# Patient Record
Sex: MALE | Race: WHITE | NOT HISPANIC OR LATINO | Employment: OTHER | URBAN - METROPOLITAN AREA
[De-identification: names, ages, dates, MRNs, and addresses within clinical notes are randomized per-mention and may not be internally consistent; named-entity substitution may affect disease eponyms.]

---

## 2018-01-26 ENCOUNTER — RECORDS - HEALTHEAST (OUTPATIENT)
Dept: LAB | Facility: CLINIC | Age: 68
End: 2018-01-26

## 2018-01-26 LAB
ANION GAP SERPL CALCULATED.3IONS-SCNC: 8 MMOL/L (ref 5–18)
BUN SERPL-MCNC: 14 MG/DL (ref 8–22)
CALCIUM SERPL-MCNC: 9.5 MG/DL (ref 8.5–10.5)
CHLORIDE BLD-SCNC: 102 MMOL/L (ref 98–107)
CO2 SERPL-SCNC: 27 MMOL/L (ref 22–31)
CREAT SERPL-MCNC: 0.84 MG/DL (ref 0.7–1.3)
GFR SERPL CREATININE-BSD FRML MDRD: >60 ML/MIN/1.73M2
GLUCOSE BLD-MCNC: 103 MG/DL (ref 70–125)
POTASSIUM BLD-SCNC: 4.2 MMOL/L (ref 3.5–5)
SODIUM SERPL-SCNC: 137 MMOL/L (ref 136–145)

## 2019-01-28 ENCOUNTER — RECORDS - HEALTHEAST (OUTPATIENT)
Dept: LAB | Facility: CLINIC | Age: 69
End: 2019-01-28

## 2019-01-28 LAB
ANION GAP SERPL CALCULATED.3IONS-SCNC: 9 MMOL/L (ref 5–18)
BUN SERPL-MCNC: 14 MG/DL (ref 8–22)
CALCIUM SERPL-MCNC: 9.6 MG/DL (ref 8.5–10.5)
CHLORIDE BLD-SCNC: 100 MMOL/L (ref 98–107)
CO2 SERPL-SCNC: 28 MMOL/L (ref 22–31)
CREAT SERPL-MCNC: 0.89 MG/DL (ref 0.7–1.3)
GFR SERPL CREATININE-BSD FRML MDRD: >60 ML/MIN/1.73M2
GLUCOSE BLD-MCNC: 90 MG/DL (ref 70–125)
POTASSIUM BLD-SCNC: 4.3 MMOL/L (ref 3.5–5)
PSA SERPL-MCNC: 0.5 NG/ML (ref 0–4.5)
SODIUM SERPL-SCNC: 137 MMOL/L (ref 136–145)

## 2020-01-30 ENCOUNTER — RECORDS - HEALTHEAST (OUTPATIENT)
Dept: LAB | Facility: CLINIC | Age: 70
End: 2020-01-30

## 2020-01-30 LAB
ANION GAP SERPL CALCULATED.3IONS-SCNC: 8 MMOL/L (ref 5–18)
BUN SERPL-MCNC: 10 MG/DL (ref 8–22)
CALCIUM SERPL-MCNC: 9.4 MG/DL (ref 8.5–10.5)
CHLORIDE BLD-SCNC: 101 MMOL/L (ref 98–107)
CHOLEST SERPL-MCNC: 204 MG/DL
CO2 SERPL-SCNC: 29 MMOL/L (ref 22–31)
CREAT SERPL-MCNC: 0.82 MG/DL (ref 0.7–1.3)
FASTING STATUS PATIENT QL REPORTED: ABNORMAL
GFR SERPL CREATININE-BSD FRML MDRD: >60 ML/MIN/1.73M2
GLUCOSE BLD-MCNC: 104 MG/DL (ref 70–125)
HDLC SERPL-MCNC: 38 MG/DL
LDLC SERPL CALC-MCNC: 137 MG/DL
POTASSIUM BLD-SCNC: 4.6 MMOL/L (ref 3.5–5)
SODIUM SERPL-SCNC: 138 MMOL/L (ref 136–145)
TRIGL SERPL-MCNC: 145 MG/DL

## 2021-03-16 ENCOUNTER — RECORDS - HEALTHEAST (OUTPATIENT)
Dept: LAB | Facility: CLINIC | Age: 71
End: 2021-03-16

## 2021-03-16 LAB
ANION GAP SERPL CALCULATED.3IONS-SCNC: 7 MMOL/L (ref 5–18)
BUN SERPL-MCNC: 15 MG/DL (ref 8–22)
CALCIUM SERPL-MCNC: 9.3 MG/DL (ref 8.5–10.5)
CHLORIDE BLD-SCNC: 101 MMOL/L (ref 98–107)
CO2 SERPL-SCNC: 28 MMOL/L (ref 22–31)
CREAT SERPL-MCNC: 0.84 MG/DL (ref 0.7–1.3)
GFR SERPL CREATININE-BSD FRML MDRD: >60 ML/MIN/1.73M2
GLUCOSE BLD-MCNC: 89 MG/DL (ref 70–125)
POTASSIUM BLD-SCNC: 4.4 MMOL/L (ref 3.5–5)
SODIUM SERPL-SCNC: 136 MMOL/L (ref 136–145)

## 2022-03-31 ENCOUNTER — LAB REQUISITION (OUTPATIENT)
Dept: LAB | Facility: CLINIC | Age: 72
End: 2022-03-31

## 2022-03-31 DIAGNOSIS — I10 ESSENTIAL (PRIMARY) HYPERTENSION: ICD-10-CM

## 2022-03-31 LAB
ANION GAP SERPL CALCULATED.3IONS-SCNC: 13 MMOL/L (ref 5–18)
BUN SERPL-MCNC: 16 MG/DL (ref 8–28)
CALCIUM SERPL-MCNC: 9.5 MG/DL (ref 8.5–10.5)
CHLORIDE BLD-SCNC: 99 MMOL/L (ref 98–107)
CO2 SERPL-SCNC: 25 MMOL/L (ref 22–31)
CREAT SERPL-MCNC: 0.89 MG/DL (ref 0.7–1.3)
GFR SERPL CREATININE-BSD FRML MDRD: >90 ML/MIN/1.73M2
GLUCOSE BLD-MCNC: 96 MG/DL (ref 70–125)
POTASSIUM BLD-SCNC: 4.5 MMOL/L (ref 3.5–5)
SODIUM SERPL-SCNC: 137 MMOL/L (ref 136–145)

## 2022-03-31 PROCEDURE — 80048 BASIC METABOLIC PNL TOTAL CA: CPT | Performed by: FAMILY MEDICINE

## 2022-07-24 ENCOUNTER — APPOINTMENT (OUTPATIENT)
Dept: RADIOLOGY | Facility: HOSPITAL | Age: 72
DRG: 281 | End: 2022-07-24
Attending: EMERGENCY MEDICINE
Payer: COMMERCIAL

## 2022-07-24 ENCOUNTER — HOSPITAL ENCOUNTER (INPATIENT)
Facility: HOSPITAL | Age: 72
LOS: 3 days | Discharge: HOME | DRG: 281 | End: 2022-07-27
Attending: EMERGENCY MEDICINE | Admitting: FAMILY MEDICINE
Payer: COMMERCIAL

## 2022-07-24 DIAGNOSIS — K57.92 DIVERTICULITIS: ICD-10-CM

## 2022-07-24 DIAGNOSIS — I21.4 NSTEMI (NON-ST ELEVATED MYOCARDIAL INFARCTION) (H): Primary | ICD-10-CM

## 2022-07-24 LAB
ALBUMIN SERPL-MCNC: 3.8 G/DL (ref 3.5–5)
ALP SERPL-CCNC: 58 U/L (ref 45–120)
ALT SERPL W P-5'-P-CCNC: 26 U/L (ref 0–45)
ANION GAP SERPL CALCULATED.3IONS-SCNC: 7 MMOL/L (ref 5–18)
APTT PPP: 33 SECONDS (ref 22–38)
AST SERPL W P-5'-P-CCNC: 42 U/L (ref 0–40)
BILIRUB DIRECT SERPL-MCNC: 0.2 MG/DL
BILIRUB SERPL-MCNC: 0.6 MG/DL (ref 0–1)
BNP SERPL-MCNC: 24 PG/ML (ref 0–67)
BUN SERPL-MCNC: 13 MG/DL (ref 8–28)
CALCIUM SERPL-MCNC: 9.3 MG/DL (ref 8.5–10.5)
CHLORIDE BLD-SCNC: 100 MMOL/L (ref 98–107)
CO2 SERPL-SCNC: 26 MMOL/L (ref 22–31)
CREAT SERPL-MCNC: 0.84 MG/DL (ref 0.7–1.3)
ERYTHROCYTE [DISTWIDTH] IN BLOOD BY AUTOMATED COUNT: 12.3 % (ref 10–15)
GFR SERPL CREATININE-BSD FRML MDRD: >90 ML/MIN/1.73M2
GLUCOSE BLD-MCNC: 105 MG/DL (ref 70–125)
HCT VFR BLD AUTO: 44.1 % (ref 40–53)
HGB BLD-MCNC: 15.1 G/DL (ref 13.3–17.7)
HOLD SPECIMEN: NORMAL
INR PPP: 1.01 (ref 0.85–1.15)
LIPASE SERPL-CCNC: 12 U/L (ref 0–52)
MCH RBC QN AUTO: 30.6 PG (ref 26.5–33)
MCHC RBC AUTO-ENTMCNC: 34.2 G/DL (ref 31.5–36.5)
MCV RBC AUTO: 89 FL (ref 78–100)
PLATELET # BLD AUTO: 218 10E3/UL (ref 150–450)
POTASSIUM BLD-SCNC: 4.3 MMOL/L (ref 3.5–5)
PROT SERPL-MCNC: 7.1 G/DL (ref 6–8)
RBC # BLD AUTO: 4.94 10E6/UL (ref 4.4–5.9)
SARS-COV-2 RNA RESP QL NAA+PROBE: NEGATIVE
SODIUM SERPL-SCNC: 133 MMOL/L (ref 136–145)
TROPONIN I SERPL-MCNC: 1.31 NG/ML (ref 0–0.29)
TROPONIN I SERPL-MCNC: 3.77 NG/ML (ref 0–0.29)
WBC # BLD AUTO: 6.7 10E3/UL (ref 4–11)

## 2022-07-24 PROCEDURE — 210N000001 HC R&B IMCU HEART CARE

## 2022-07-24 PROCEDURE — 250N000013 HC RX MED GY IP 250 OP 250 PS 637: Performed by: EMERGENCY MEDICINE

## 2022-07-24 PROCEDURE — 80053 COMPREHEN METABOLIC PANEL: CPT | Performed by: EMERGENCY MEDICINE

## 2022-07-24 PROCEDURE — 83880 ASSAY OF NATRIURETIC PEPTIDE: CPT | Performed by: EMERGENCY MEDICINE

## 2022-07-24 PROCEDURE — 85730 THROMBOPLASTIN TIME PARTIAL: CPT | Performed by: EMERGENCY MEDICINE

## 2022-07-24 PROCEDURE — 93005 ELECTROCARDIOGRAM TRACING: CPT

## 2022-07-24 PROCEDURE — 82248 BILIRUBIN DIRECT: CPT | Performed by: EMERGENCY MEDICINE

## 2022-07-24 PROCEDURE — C9803 HOPD COVID-19 SPEC COLLECT: HCPCS

## 2022-07-24 PROCEDURE — 85610 PROTHROMBIN TIME: CPT | Performed by: EMERGENCY MEDICINE

## 2022-07-24 PROCEDURE — 85027 COMPLETE CBC AUTOMATED: CPT | Performed by: EMERGENCY MEDICINE

## 2022-07-24 PROCEDURE — 96375 TX/PRO/DX INJ NEW DRUG ADDON: CPT

## 2022-07-24 PROCEDURE — 96366 THER/PROPH/DIAG IV INF ADDON: CPT

## 2022-07-24 PROCEDURE — 36592 COLLECT BLOOD FROM PICC: CPT | Performed by: INTERNAL MEDICINE

## 2022-07-24 PROCEDURE — 84484 ASSAY OF TROPONIN QUANT: CPT | Performed by: EMERGENCY MEDICINE

## 2022-07-24 PROCEDURE — 83690 ASSAY OF LIPASE: CPT | Performed by: EMERGENCY MEDICINE

## 2022-07-24 PROCEDURE — 99223 1ST HOSP IP/OBS HIGH 75: CPT | Performed by: FAMILY MEDICINE

## 2022-07-24 PROCEDURE — 250N000011 HC RX IP 250 OP 636: Performed by: EMERGENCY MEDICINE

## 2022-07-24 PROCEDURE — 99285 EMERGENCY DEPT VISIT HI MDM: CPT | Mod: 25

## 2022-07-24 PROCEDURE — 96365 THER/PROPH/DIAG IV INF INIT: CPT

## 2022-07-24 PROCEDURE — 86901 BLOOD TYPING SEROLOGIC RH(D): CPT | Performed by: INTERNAL MEDICINE

## 2022-07-24 PROCEDURE — 93005 ELECTROCARDIOGRAM TRACING: CPT | Performed by: STUDENT IN AN ORGANIZED HEALTH CARE EDUCATION/TRAINING PROGRAM

## 2022-07-24 PROCEDURE — 93005 ELECTROCARDIOGRAM TRACING: CPT | Mod: 76

## 2022-07-24 PROCEDURE — 71046 X-RAY EXAM CHEST 2 VIEWS: CPT

## 2022-07-24 PROCEDURE — 87635 SARS-COV-2 COVID-19 AMP PRB: CPT | Performed by: EMERGENCY MEDICINE

## 2022-07-24 PROCEDURE — 36415 COLL VENOUS BLD VENIPUNCTURE: CPT | Performed by: EMERGENCY MEDICINE

## 2022-07-24 PROCEDURE — 93005 ELECTROCARDIOGRAM TRACING: CPT | Performed by: EMERGENCY MEDICINE

## 2022-07-24 RX ORDER — ASPIRIN 81 MG/1
81 TABLET, CHEWABLE ORAL DAILY
Status: DISCONTINUED | OUTPATIENT
Start: 2022-07-25 | End: 2022-07-27 | Stop reason: HOSPADM

## 2022-07-24 RX ORDER — VITAMIN E 268 MG
400 CAPSULE ORAL DAILY
COMMUNITY

## 2022-07-24 RX ORDER — ONDANSETRON 2 MG/ML
4 INJECTION INTRAMUSCULAR; INTRAVENOUS EVERY 6 HOURS PRN
Status: DISCONTINUED | OUTPATIENT
Start: 2022-07-24 | End: 2022-07-27 | Stop reason: HOSPADM

## 2022-07-24 RX ORDER — PROCHLORPERAZINE 25 MG
12.5 SUPPOSITORY, RECTAL RECTAL EVERY 12 HOURS PRN
Status: DISCONTINUED | OUTPATIENT
Start: 2022-07-24 | End: 2022-07-27 | Stop reason: HOSPADM

## 2022-07-24 RX ORDER — PANTOPRAZOLE SODIUM 40 MG/1
40 TABLET, DELAYED RELEASE ORAL DAILY
Status: DISCONTINUED | OUTPATIENT
Start: 2022-07-25 | End: 2022-07-27 | Stop reason: HOSPADM

## 2022-07-24 RX ORDER — NITROGLYCERIN 80 MG/1
1 PATCH TRANSDERMAL ONCE
Status: DISCONTINUED | OUTPATIENT
Start: 2022-07-24 | End: 2022-07-24

## 2022-07-24 RX ORDER — MORPHINE SULFATE 2 MG/ML
1 INJECTION, SOLUTION INTRAMUSCULAR; INTRAVENOUS
Status: DISCONTINUED | OUTPATIENT
Start: 2022-07-24 | End: 2022-07-27 | Stop reason: HOSPADM

## 2022-07-24 RX ORDER — LISINOPRIL 5 MG/1
10 TABLET ORAL DAILY
Status: DISCONTINUED | OUTPATIENT
Start: 2022-07-25 | End: 2022-07-27 | Stop reason: HOSPADM

## 2022-07-24 RX ORDER — ACETAMINOPHEN 325 MG/1
650 TABLET ORAL EVERY 6 HOURS PRN
Status: DISCONTINUED | OUTPATIENT
Start: 2022-07-24 | End: 2022-07-25

## 2022-07-24 RX ORDER — ACETAMINOPHEN 650 MG/1
650 SUPPOSITORY RECTAL EVERY 6 HOURS PRN
Status: DISCONTINUED | OUTPATIENT
Start: 2022-07-24 | End: 2022-07-27 | Stop reason: HOSPADM

## 2022-07-24 RX ORDER — NITROGLYCERIN 20 MG/100ML
10-200 INJECTION INTRAVENOUS CONTINUOUS
Status: DISCONTINUED | OUTPATIENT
Start: 2022-07-24 | End: 2022-07-25

## 2022-07-24 RX ORDER — LIDOCAINE 40 MG/G
CREAM TOPICAL
Status: DISCONTINUED | OUTPATIENT
Start: 2022-07-24 | End: 2022-07-27 | Stop reason: HOSPADM

## 2022-07-24 RX ORDER — ATORVASTATIN CALCIUM 40 MG/1
40 TABLET, FILM COATED ORAL DAILY
Status: DISCONTINUED | OUTPATIENT
Start: 2022-07-25 | End: 2022-07-27 | Stop reason: HOSPADM

## 2022-07-24 RX ORDER — ASPIRIN 81 MG/1
324 TABLET, CHEWABLE ORAL ONCE
Status: DISCONTINUED | OUTPATIENT
Start: 2022-07-24 | End: 2022-07-24

## 2022-07-24 RX ORDER — HEPARIN SODIUM 10000 [USP'U]/100ML
0-5000 INJECTION, SOLUTION INTRAVENOUS CONTINUOUS
Status: DISCONTINUED | OUTPATIENT
Start: 2022-07-24 | End: 2022-07-25

## 2022-07-24 RX ORDER — ONDANSETRON 4 MG/1
4 TABLET, ORALLY DISINTEGRATING ORAL EVERY 6 HOURS PRN
Status: DISCONTINUED | OUTPATIENT
Start: 2022-07-24 | End: 2022-07-27 | Stop reason: HOSPADM

## 2022-07-24 RX ORDER — ASPIRIN 81 MG/1
162 TABLET, CHEWABLE ORAL ONCE
Status: COMPLETED | OUTPATIENT
Start: 2022-07-24 | End: 2022-07-24

## 2022-07-24 RX ORDER — PROCHLORPERAZINE MALEATE 5 MG
5 TABLET ORAL EVERY 6 HOURS PRN
Status: DISCONTINUED | OUTPATIENT
Start: 2022-07-24 | End: 2022-07-27 | Stop reason: HOSPADM

## 2022-07-24 RX ADMIN — HEPARIN SODIUM 1200 UNITS/HR: 10000 INJECTION, SOLUTION INTRAVENOUS at 21:55

## 2022-07-24 RX ADMIN — ASPIRIN 81 MG CHEWABLE TABLET 162 MG: 81 TABLET CHEWABLE at 21:43

## 2022-07-24 RX ADMIN — NITROGLYCERIN 1 PATCH: 0.4 PATCH TRANSDERMAL at 21:53

## 2022-07-24 ASSESSMENT — ACTIVITIES OF DAILY LIVING (ADL): ADLS_ACUITY_SCORE: 35

## 2022-07-24 NOTE — ED TRIAGE NOTES
"Pt c/o right chest pain and bilateral jaw pain over the 1.5 days. This is intermitent but is getting more frequent. Pain at its worse is 5/10 and it is 2/10 now.  pts chest\"burns\" at times as well.  No sob. Some dizziness. No prior heart issues.      "

## 2022-07-24 NOTE — ED PROVIDER NOTES
"  Emergency Department Encounter     Evaluation Date & Time:   2022  4:58 PM    CHIEF COMPLAINT:  Chest Pain      Triage Note: Pt c/o right chest pain and bilateral jaw pain over the 1.5 days. This is intermitent but is getting more frequent. Pain at its worse is 5/10 and it is 2/10 now.  pts chest\"burns\" at times as well.  No sob. Some dizziness. No prior heart issues.      Impression and Plan       FINAL IMPRESSION:    ICD-10-CM    1. NSTEMI (non-ST elevated myocardial infarction) (H)  I21.4          ED COURSE & MEDICAL DECISION MAKIN:03 PM I met with patient for initial interview and encounter. PPE worn includes surgical mask.  9:39 PM I spoke to Dr. Osman with cardiology regarding plan for care.    72 year old male, history of diverticulitis, who presents for evaluation of BL burning chest pain radiating to BL jaw and right shoulder that has been constant since onset yesterday. Symptoms are worse with exertion. He has associated lightheadedness with no shortness of breath, nausea, vomiting, diaphoresis.    On exam he has borderline bradycardic rate with regular rhythm and clear lungs.    EKG performed and demonstrated sinus bradycardia with incomplete RBBB and no ST-T wave changes consistent with ACS.    CXR negative.    Troponin extremely delayed; I called the Lab twice with the first call being ~90 minutes after the troponin had been sent and was told it would be only a few more minutes. After it still had not been resulted 4 hours later, I again called the Lab and was placed on hold for several minutes and had to disconnect to care for other patients. I asked the charge RN to call Lab and she was informed that the troponin and BMP had hemolyzed. The oncoming RN resent blood (including blood for a delta troponin) and when the initial troponin finally resulted it was elevated at 1.31. The delta troponin was further elevated to 3.77.    Patient given aspirin and Nitropaste was applied. After discussion " to rule out contraindications to anticoagulation, a heparin drip was initiated.    Repeat EKG performed with no significant changes or ST-T wave elevation consistent with ACS.    Cardiology consulted and agree with Nitropaste and heparin drip.  They also recommend starting a statin.  Given bradycardia and blood pressures of 120s, will hold on metoprolol at this time. NPO at midnight.    Pain is much improved after Nitropaste, now rating his pain at ~2/10. He declined morphine.    Patient admitted to Hospitalist Service. Patient hemodynamically stable throughout ED course.      At the conclusion of the encounter I discussed the results of all the tests and the disposition. The questions were answered. The patient and family acknowledged understanding and were agreeable with the care plan.    30 minutes of critical care time    MEDICATIONS GIVEN IN THE EMERGENCY DEPARTMENT:  Medications   heparin infusion 25,000 units in D5W 250 mL ANTICOAGULANT (1,200 Units/hr Intravenous Rate/Dose Verify 7/25/22 0626)   lisinopril (ZESTRIL) tablet 10 mg (10 mg Oral Given 7/25/22 0759)   pantoprazole (PROTONIX) EC tablet 40 mg (40 mg Oral Given 7/25/22 0759)   medication instruction (has no administration in time range)   aspirin (ASA) chewable tablet 81 mg (81 mg Oral Given 7/25/22 0751)   HOLD: nitroGLYcerin IF (has no administration in time range)   Reason beta blocker not prescribed (has no administration in time range)   Continuing ACE inhibitor/ARB/ARNI from home medication list OR ACE inhibitor/ARB order already placed during this visit (has no administration in time range)   atorvastatin (LIPITOR) tablet 40 mg (has no administration in time range)   morphine (PF) injection 1 mg (has no administration in time range)   nitroGLYcerin 50 mg in D5W 250 mL (adult std) infusion CENTRAL (0 mcg/min Intravenous Stopped 7/25/22 0650)   lidocaine 1 % 0.1-1 mL (has no administration in time range)   lidocaine (LMX4) cream (has no  administration in time range)   sodium chloride (PF) 0.9% PF flush 3 mL (3 mLs Intracatheter Not Given 7/25/22 0645)   sodium chloride (PF) 0.9% PF flush 3 mL (has no administration in time range)   melatonin tablet 1 mg (has no administration in time range)   Patient is already receiving anticoagulation with heparin, enoxaparin (LOVENOX), warfarin (COUMADIN)  or other anticoagulant medication (has no administration in time range)   acetaminophen (TYLENOL) tablet 650 mg (has no administration in time range)     Or   acetaminophen (TYLENOL) Suppository 650 mg (has no administration in time range)   ondansetron (ZOFRAN ODT) ODT tab 4 mg (has no administration in time range)     Or   ondansetron (ZOFRAN) injection 4 mg (has no administration in time range)   prochlorperazine (COMPAZINE) injection 5 mg (has no administration in time range)     Or   prochlorperazine (COMPAZINE) tablet 5 mg (has no administration in time range)     Or   prochlorperazine (COMPAZINE) suppository 12.5 mg (has no administration in time range)   nitroGLYcerin (NITROSTAT) sublingual tablet 0.4 mg (0.4 mg Sublingual Given 7/25/22 0019)   aspirin (ASA) chewable tablet 162 mg (162 mg Oral Given 7/24/22 2143)   heparin ANTICOAGULANT loading dose for  LOW INTENSITY TREATMENT* Give BEFORE starting heparin infusion (6,000 Units Intravenous Given 7/24/22 2155)   aspirin EC tablet 162 mg (162 mg Oral Given 7/25/22 0019)       NEW PRESCRIPTIONS STARTED AT TODAY'S ED VISIT:  New Prescriptions    No medications on file       HPI     HPI     Dilip Frye is a 72 year old male, history of diverticulitis, who presents to this ED by walk-in for evaluation of chest pain.     Patient reports an ongoing burning sensation in both lungs (R > L) with associated bilateral jaw pain that right shoulder pain that has been nearly constant since onset yesterday. Symptoms are worse with exertion. The pain is not pleuritic in nature. He reports associated  lightheadedness.  No associated shortness of breath, nausea, vomiting, diaphoresis.    He has otherwise been in his usual state of health and denies abdominal pain, N/V/D, cough, fever or other concerns.    REVIEW OF SYSTEMS:  All other systems reviewed and are negative.      Medical History     Past Medical History:   Diagnosis Date     Benign essential hypertension      Gastroesophageal reflux disease with esophagitis        Past Surgical History:   Procedure Laterality Date     CERVICAL LAMINECTOMY         Family History   Problem Relation Age of Onset     Brain Cancer Mother      No Known Problems Father      Coronary Artery Disease Early Onset No family hx of        Social History     Tobacco Use     Smoking status: Never Smoker     Smokeless tobacco: Never Used   Substance Use Topics     Alcohol use: Yes     Alcohol/week: 1.0 standard drink     Comment: Alcoholic Drinks/day: One drink twice a month per pt     Drug use: No       cholecalciferol (VITAMIN D3) 125 mcg (5000 units) capsule  lisinopril (PRINIVIL,ZESTRIL) 10 MG tablet  pantoprazole (PROTONIX) 40 MG tablet  vitamin E (TOCOPHEROL) 400 units (180 mg) capsule        Physical Exam     First Vitals:  Patient Vitals for the past 24 hrs:   BP Temp Temp src Pulse Resp SpO2 Height Weight   07/25/22 0748 119/73 98.1  F (36.7  C) Oral 69 24 98 % -- --   07/25/22 0645 99/69 -- -- 59 20 94 % -- --   07/25/22 0640 104/70 -- -- 59 16 96 % -- --   07/25/22 0635 101/62 -- -- 53 14 96 % -- --   07/25/22 0630 90/65 -- -- 57 15 96 % -- --   07/25/22 0625 93/66 -- -- 54 14 95 % -- --   07/25/22 0620 107/66 -- -- 56 14 97 % -- --   07/25/22 0615 97/62 -- -- (!) 48 14 95 % -- --   07/25/22 0610 104/67 -- -- 52 15 96 % -- --   07/25/22 0605 102/71 -- -- (!) 49 15 95 % -- --   07/25/22 0600 102/69 -- -- 51 14 95 % -- --   07/25/22 0555 102/75 -- -- 63 18 95 % -- --   07/25/22 0550 109/72 -- -- 53 16 96 % -- --   07/25/22 0545 99/62 -- -- 52 16 95 % -- --   07/25/22 0540  110/58 -- -- 55 14 94 % -- --   07/25/22 0535 104/72 -- -- 58 14 98 % -- --   07/25/22 0530 101/68 -- -- 53 16 95 % -- --   07/25/22 0525 106/70 -- -- 56 15 96 % -- --   07/25/22 0520 98/68 -- -- 52 15 95 % -- --   07/25/22 0515 104/70 -- -- 54 13 96 % -- --   07/25/22 0510 104/70 -- -- 51 17 96 % -- --   07/25/22 0505 98/70 -- -- 52 12 96 % -- --   07/25/22 0500 94/66 -- -- (!) 49 16 95 % -- --   07/25/22 0455 95/65 -- -- (!) 49 13 95 % -- --   07/25/22 0450 93/67 -- -- 54 16 94 % -- --   07/25/22 0445 93/64 -- -- 53 18 95 % -- --   07/25/22 0440 94/69 -- -- 58 24 95 % -- --   07/25/22 0435 92/65 -- -- 50 16 95 % -- --   07/25/22 0430 92/69 -- -- (!) 49 16 95 % -- --   07/25/22 0425 (!) 87/67 -- -- 53 16 95 % -- --   07/25/22 0420 96/67 -- -- 56 14 94 % -- --   07/25/22 0415 110/67 -- -- 55 17 97 % -- --   07/25/22 0410 102/64 -- -- 50 12 97 % -- --   07/25/22 0405 98/61 -- -- 58 20 95 % -- --   07/25/22 0400 99/65 -- -- 54 14 95 % -- --   07/25/22 0355 104/75 -- -- 56 21 99 % -- --   07/25/22 0350 90/65 -- -- (!) 48 15 95 % -- --   07/25/22 0345 97/67 -- -- 52 18 94 % -- --   07/25/22 0340 94/66 -- -- 54 18 95 % -- --   07/25/22 0335 104/73 -- -- 62 21 98 % -- --   07/25/22 0330 110/67 -- -- 57 14 96 % -- --   07/25/22 0325 99/70 -- -- 57 11 96 % -- --   07/25/22 0320 92/65 -- -- 56 13 94 % -- --   07/25/22 0315 103/59 -- -- 54 10 96 % -- --   07/25/22 0310 108/67 -- -- 55 14 95 % -- --   07/25/22 0305 109/74 -- -- 55 11 97 % -- --   07/25/22 0300 106/72 -- -- 57 10 99 % -- --   07/25/22 0219 105/72 -- -- 56 16 97 % -- --   07/25/22 0100 94/58 -- -- 54 14 96 % -- --   07/25/22 0046 (!) 89/56 -- -- 55 13 95 % -- --   07/24/22 2345 90/59 -- -- 52 13 93 % -- --   07/24/22 2330 93/60 -- -- 51 12 93 % -- --   07/24/22 2315 104/67 -- -- 52 20 97 % -- --   07/24/22 2300 113/69 -- -- 55 16 97 % -- --   07/24/22 2255 116/66 -- -- 56 17 97 % -- --   07/24/22 2245 -- -- -- 54 12 96 % -- --   07/24/22 2230 -- -- -- 63 18 98 %  "-- --   07/24/22 2215 -- -- -- 62 18 98 % -- --   07/24/22 2200 120/82 -- -- 59 11 98 % -- --   07/24/22 2125 -- -- -- 58 16 99 % -- --   07/24/22 2100 -- -- -- 59 16 100 % -- --   07/24/22 1945 -- -- -- 57 18 100 % -- --   07/24/22 1930 122/79 -- -- 52 12 100 % -- --   07/24/22 1915 126/84 -- -- 52 11 100 % -- --   07/24/22 1900 119/80 -- -- 56 16 100 % -- --   07/24/22 1845 127/77 -- -- 54 15 100 % -- --   07/24/22 1715 122/78 -- -- 54 11 99 % -- --   07/24/22 1646 (!) 144/87 -- -- -- -- -- -- --   07/24/22 1644 -- 96.8  F (36  C) Tympanic 61 12 100 % 1.93 m (6' 4\") 100.2 kg (221 lb)       PHYSICAL EXAM:   Physical Exam    GENERAL: Awake, alert.  In no acute distress.   HEENT: Normocephalic, atraumatic. Pupils equal, round and reactive. Conjunctiva normal.   NECK: No stridor.  PULMONARY: Symmetrical breath sounds without distress.  Lungs clear to auscultation bilaterally without wheezes, rhonchi or rales.  CARDIO: Regular rate and rhythm.  No significant murmur, rub or gallop.  Radial pulses strong and symmetrical.  ABDOMINAL: Abdomen soft, non-distended and non-tender to palpation.   EXTREMITIES: No lower extremity swelling or edema.      NEURO: Alert and oriented to person, place and time.  Cranial nerves grossly intact.  No focal motor deficit.  PSYCH: Normal mood and affect.  SKIN: No rashes.     Results     LAB:  All pertinent labs reviewed and interpreted  Labs Ordered and Resulted from Time of ED Arrival to Time of ED Departure   BASIC METABOLIC PANEL - Abnormal       Result Value    Sodium 133 (*)     Potassium 4.3      Chloride 100      Carbon Dioxide (CO2) 26      Anion Gap 7      Urea Nitrogen 13      Creatinine 0.84      Calcium 9.3      Glucose 105      GFR Estimate >90     TROPONIN I - Abnormal    Troponin I 1.31 (*)    HEPATIC FUNCTION PANEL - Abnormal    Bilirubin Total 0.6      Bilirubin Direct 0.2      Protein Total 7.1      Albumin 3.8      Alkaline Phosphatase 58      AST 42 (*)     ALT 26   "   TROPONIN I - Abnormal    Troponin I 3.77 (*)    TROPONIN I - Abnormal    Troponin I 6.24 (*)    LIPID REFLEX TO DIRECT LDL PANEL - Abnormal    Cholesterol 184      Triglycerides 171 (*)     Direct Measure HDL 31 (*)     LDL Cholesterol Calculated 119     BASIC METABOLIC PANEL - Abnormal    Sodium 133 (*)     Potassium 4.2      Chloride 101      Carbon Dioxide (CO2) 24      Anion Gap 8      Urea Nitrogen 13      Creatinine 0.79      Calcium 9.0      Glucose 105      GFR Estimate >90     CBC WITH PLATELETS - Normal    WBC Count 6.7      RBC Count 4.94      Hemoglobin 15.1      Hematocrit 44.1      MCV 89      MCH 30.6      MCHC 34.2      RDW 12.3      Platelet Count 218     B-TYPE NATRIURETIC PEPTIDE ([facility] ONLY) - Normal    BNP 24     INR - Normal    INR 1.01     PARTIAL THROMBOPLASTIN TIME - Normal    aPTT 33     LIPASE - Normal    Lipase 12     COVID-19 VIRUS (CORONAVIRUS) BY PCR - Normal    SARS CoV2 PCR Negative     HEPARIN UNFRACTIONATED ANTI XA LEVEL - Normal    Anti Xa Unfractionated Heparin 0.25     TSH - Normal    TSH 2.04     CORTISOL    Cortisol 17.6     TROPONIN I   HEPARIN UNFRACTIONATED ANTI XA LEVEL       RADIOLOGY:  Chest XR,  PA & LAT   Final Result   IMPRESSION: Negative chest.            EC2022, 16:52; sinus bradycardia with rate of 57 bpm; normal intervals; incomplete RBBB; no ST-T wave changes consistent with ACS or pericarditis; no previous EKG available for comparison    EKG independently reviewed and interpreted by Radha Villanueva MD    2022, 21:41; sinus bradycardia with rate of 56 bpm; occasional PVCs; normal intervals; incomplete RBBB; no ST-T wave changes consistent with ACS or pericarditis; compared to previous EKG dated 2022, occasional PVCs now present    EKG independently reviewed and interpreted by Radha Villanueva MD      PROCEDURES:  Procedures:      Critical Care     Performed by: Dr Radha Villanueva  Authorized by: Dr Radha Villanueva  Total critical care time: 30  minutes  Critical care was necessary to treat or prevent imminent or life-threatening deterioration of the following conditions: Acute coronary syndrome / ACS    Critical care was time spent personally by me on the following activities: development of treatment plan with patient or surrogate, discussions with consultants, examination of patient, evaluation of patient's response to treatment, obtaining history from patient or surrogate, ordering and performing treatments and interventions, ordering and review of laboratory studies, ordering and review of radiographic studies, re-evaluation of patient's condition and monitoring for potential decompensation.  Critical care time was exclusive of separately billable procedures and treating other patients.      I, Rob Capps, am serving as a scribe to document services personally performed by Radha Villanueva MD based on my observation and the provider's statements to me. I, Radha Villanueva MD attest that Rob Capps is acting in a scribe capacity, has observed my performance of the services and has documented them in accordance with my direction.    Radha Villanueva MD  Emergency Medicine  [facility] EMERGENCY DEPARTMENT         Radha Villanueva MD  07/25/22 9238

## 2022-07-25 ENCOUNTER — APPOINTMENT (OUTPATIENT)
Dept: CARDIOLOGY | Facility: HOSPITAL | Age: 72
DRG: 281 | End: 2022-07-25
Attending: INTERNAL MEDICINE
Payer: COMMERCIAL

## 2022-07-25 PROBLEM — R00.1 SINUS BRADYCARDIA: Status: ACTIVE | Noted: 2022-07-25

## 2022-07-25 PROBLEM — I25.10 CAD (CORONARY ARTERY DISEASE): Status: ACTIVE | Noted: 2022-07-25

## 2022-07-25 PROBLEM — K21.9 GASTROESOPHAGEAL REFLUX DISEASE WITHOUT ESOPHAGITIS: Status: ACTIVE | Noted: 2022-07-25

## 2022-07-25 PROBLEM — E87.1 HYPONATREMIA: Status: ACTIVE | Noted: 2022-07-25

## 2022-07-25 PROBLEM — I10 BENIGN ESSENTIAL HYPERTENSION: Status: ACTIVE | Noted: 2022-07-25

## 2022-07-25 LAB
ABO/RH(D): NORMAL
ANION GAP SERPL CALCULATED.3IONS-SCNC: 8 MMOL/L (ref 5–18)
ANTIBODY SCREEN: NEGATIVE
ATRIAL RATE - MUSE: 56 BPM
ATRIAL RATE - MUSE: 57 BPM
BI-PLANE LVEF ECHO: NORMAL
BUN SERPL-MCNC: 13 MG/DL (ref 8–28)
CALCIUM SERPL-MCNC: 9 MG/DL (ref 8.5–10.5)
CHLORIDE BLD-SCNC: 101 MMOL/L (ref 98–107)
CHOLEST SERPL-MCNC: 184 MG/DL
CO2 SERPL-SCNC: 24 MMOL/L (ref 22–31)
CORTIS SERPL-MCNC: 17.6 UG/DL
CREAT SERPL-MCNC: 0.79 MG/DL (ref 0.7–1.3)
DIASTOLIC BLOOD PRESSURE - MUSE: 86 MMHG
DIASTOLIC BLOOD PRESSURE - MUSE: NORMAL MMHG
GFR SERPL CREATININE-BSD FRML MDRD: >90 ML/MIN/1.73M2
GLUCOSE BLD-MCNC: 105 MG/DL (ref 70–125)
HDLC SERPL-MCNC: 31 MG/DL
INTERPRETATION ECG - MUSE: NORMAL
INTERPRETATION ECG - MUSE: NORMAL
LDLC SERPL CALC-MCNC: 119 MG/DL
P AXIS - MUSE: 64 DEGREES
P AXIS - MUSE: 72 DEGREES
POTASSIUM BLD-SCNC: 4.2 MMOL/L (ref 3.5–5)
PR INTERVAL - MUSE: 156 MS
PR INTERVAL - MUSE: 164 MS
QRS DURATION - MUSE: 100 MS
QRS DURATION - MUSE: 106 MS
QT - MUSE: 404 MS
QT - MUSE: 404 MS
QTC - MUSE: 389 MS
QTC - MUSE: 393 MS
R AXIS - MUSE: -6 DEGREES
R AXIS - MUSE: 58 DEGREES
SODIUM SERPL-SCNC: 133 MMOL/L (ref 136–145)
SPECIMEN EXPIRATION DATE: NORMAL
SYSTOLIC BLOOD PRESSURE - MUSE: 130 MMHG
SYSTOLIC BLOOD PRESSURE - MUSE: NORMAL MMHG
T AXIS - MUSE: 5 DEGREES
T AXIS - MUSE: 67 DEGREES
TRIGL SERPL-MCNC: 171 MG/DL
TROPONIN I SERPL-MCNC: 6.24 NG/ML (ref 0–0.29)
TROPONIN I SERPL-MCNC: 7.53 NG/ML (ref 0–0.29)
TSH SERPL DL<=0.005 MIU/L-ACNC: 2.04 UIU/ML (ref 0.3–5)
UFH PPP CHRO-ACNC: 0.25 IU/ML
VENTRICULAR RATE- MUSE: 56 BPM
VENTRICULAR RATE- MUSE: 57 BPM

## 2022-07-25 PROCEDURE — C1894 INTRO/SHEATH, NON-LASER: HCPCS | Performed by: INTERNAL MEDICINE

## 2022-07-25 PROCEDURE — 84443 ASSAY THYROID STIM HORMONE: CPT | Performed by: FAMILY MEDICINE

## 2022-07-25 PROCEDURE — 250N000013 HC RX MED GY IP 250 OP 250 PS 637: Performed by: INTERNAL MEDICINE

## 2022-07-25 PROCEDURE — 4A023N7 MEASUREMENT OF CARDIAC SAMPLING AND PRESSURE, LEFT HEART, PERCUTANEOUS APPROACH: ICD-10-PCS | Performed by: INTERNAL MEDICINE

## 2022-07-25 PROCEDURE — 99223 1ST HOSP IP/OBS HIGH 75: CPT | Mod: 25 | Performed by: INTERNAL MEDICINE

## 2022-07-25 PROCEDURE — 99233 SBSQ HOSP IP/OBS HIGH 50: CPT | Performed by: FAMILY MEDICINE

## 2022-07-25 PROCEDURE — 210N000001 HC R&B IMCU HEART CARE

## 2022-07-25 PROCEDURE — 250N000013 HC RX MED GY IP 250 OP 250 PS 637: Performed by: FAMILY MEDICINE

## 2022-07-25 PROCEDURE — 93458 L HRT ARTERY/VENTRICLE ANGIO: CPT | Mod: 26 | Performed by: INTERNAL MEDICINE

## 2022-07-25 PROCEDURE — 85520 HEPARIN ASSAY: CPT | Performed by: FAMILY MEDICINE

## 2022-07-25 PROCEDURE — 93458 L HRT ARTERY/VENTRICLE ANGIO: CPT | Performed by: INTERNAL MEDICINE

## 2022-07-25 PROCEDURE — 250N000009 HC RX 250: Performed by: INTERNAL MEDICINE

## 2022-07-25 PROCEDURE — 250N000011 HC RX IP 250 OP 636: Performed by: FAMILY MEDICINE

## 2022-07-25 PROCEDURE — B211YZZ FLUOROSCOPY OF MULTIPLE CORONARY ARTERIES USING OTHER CONTRAST: ICD-10-PCS | Performed by: INTERNAL MEDICINE

## 2022-07-25 PROCEDURE — 93306 TTE W/DOPPLER COMPLETE: CPT

## 2022-07-25 PROCEDURE — 36415 COLL VENOUS BLD VENIPUNCTURE: CPT | Performed by: FAMILY MEDICINE

## 2022-07-25 PROCEDURE — 250N000011 HC RX IP 250 OP 636: Performed by: INTERNAL MEDICINE

## 2022-07-25 PROCEDURE — 99152 MOD SED SAME PHYS/QHP 5/>YRS: CPT | Performed by: INTERNAL MEDICINE

## 2022-07-25 PROCEDURE — 93306 TTE W/DOPPLER COMPLETE: CPT | Mod: 26 | Performed by: GENERAL ACUTE CARE HOSPITAL

## 2022-07-25 PROCEDURE — 272N000001 HC OR GENERAL SUPPLY STERILE: Performed by: INTERNAL MEDICINE

## 2022-07-25 PROCEDURE — B215YZZ FLUOROSCOPY OF LEFT HEART USING OTHER CONTRAST: ICD-10-PCS | Performed by: INTERNAL MEDICINE

## 2022-07-25 PROCEDURE — 80061 LIPID PANEL: CPT | Performed by: FAMILY MEDICINE

## 2022-07-25 PROCEDURE — 84484 ASSAY OF TROPONIN QUANT: CPT | Performed by: FAMILY MEDICINE

## 2022-07-25 PROCEDURE — 82533 TOTAL CORTISOL: CPT | Performed by: FAMILY MEDICINE

## 2022-07-25 PROCEDURE — 80048 BASIC METABOLIC PNL TOTAL CA: CPT | Performed by: FAMILY MEDICINE

## 2022-07-25 PROCEDURE — 255N000002 HC RX 255 OP 636: Performed by: INTERNAL MEDICINE

## 2022-07-25 PROCEDURE — C1769 GUIDE WIRE: HCPCS | Performed by: INTERNAL MEDICINE

## 2022-07-25 PROCEDURE — C1887 CATHETER, GUIDING: HCPCS | Performed by: INTERNAL MEDICINE

## 2022-07-25 RX ORDER — NITROGLYCERIN 0.4 MG/1
0.4 TABLET SUBLINGUAL EVERY 5 MIN PRN
Status: DISCONTINUED | OUTPATIENT
Start: 2022-07-25 | End: 2022-07-27 | Stop reason: HOSPADM

## 2022-07-25 RX ORDER — NITROGLYCERIN 0.4 MG/1
TABLET SUBLINGUAL
Qty: 25 TABLET | Refills: 3 | Status: SHIPPED | OUTPATIENT
Start: 2022-07-25 | End: 2023-08-10

## 2022-07-25 RX ORDER — ASPIRIN 81 MG/1
243 TABLET, CHEWABLE ORAL ONCE
Status: COMPLETED | OUTPATIENT
Start: 2022-07-25 | End: 2022-07-25

## 2022-07-25 RX ORDER — ASPIRIN 325 MG
325 TABLET ORAL ONCE
Status: DISCONTINUED | OUTPATIENT
Start: 2022-07-25 | End: 2022-07-25 | Stop reason: HOSPADM

## 2022-07-25 RX ORDER — HYDRALAZINE HYDROCHLORIDE 20 MG/ML
10 INJECTION INTRAMUSCULAR; INTRAVENOUS
Status: DISCONTINUED | OUTPATIENT
Start: 2022-07-25 | End: 2022-07-27 | Stop reason: HOSPADM

## 2022-07-25 RX ORDER — DIAZEPAM 5 MG
5 TABLET ORAL
Status: COMPLETED | OUTPATIENT
Start: 2022-07-25 | End: 2022-07-25

## 2022-07-25 RX ORDER — NALOXONE HYDROCHLORIDE 0.4 MG/ML
0.4 INJECTION, SOLUTION INTRAMUSCULAR; INTRAVENOUS; SUBCUTANEOUS
Status: ACTIVE | OUTPATIENT
Start: 2022-07-25 | End: 2022-07-25

## 2022-07-25 RX ORDER — NALOXONE HYDROCHLORIDE 0.4 MG/ML
0.2 INJECTION, SOLUTION INTRAMUSCULAR; INTRAVENOUS; SUBCUTANEOUS
Status: ACTIVE | OUTPATIENT
Start: 2022-07-25 | End: 2022-07-25

## 2022-07-25 RX ORDER — FLUMAZENIL 0.1 MG/ML
0.2 INJECTION, SOLUTION INTRAVENOUS
Status: ACTIVE | OUTPATIENT
Start: 2022-07-25 | End: 2022-07-25

## 2022-07-25 RX ORDER — IODIXANOL 320 MG/ML
INJECTION, SOLUTION INTRAVASCULAR
Status: DISCONTINUED | OUTPATIENT
Start: 2022-07-25 | End: 2022-07-25 | Stop reason: HOSPADM

## 2022-07-25 RX ORDER — METOPROLOL SUCCINATE 25 MG/1
25 TABLET, EXTENDED RELEASE ORAL DAILY
Status: DISCONTINUED | OUTPATIENT
Start: 2022-07-25 | End: 2022-07-27 | Stop reason: HOSPADM

## 2022-07-25 RX ORDER — ISOSORBIDE MONONITRATE 30 MG/1
30 TABLET, EXTENDED RELEASE ORAL DAILY
Status: DISCONTINUED | OUTPATIENT
Start: 2022-07-25 | End: 2022-07-27 | Stop reason: HOSPADM

## 2022-07-25 RX ORDER — SODIUM CHLORIDE 9 MG/ML
75 INJECTION, SOLUTION INTRAVENOUS CONTINUOUS
Status: ACTIVE | OUTPATIENT
Start: 2022-07-25 | End: 2022-07-25

## 2022-07-25 RX ORDER — ASPIRIN 81 MG/1
162 TABLET ORAL ONCE
Status: COMPLETED | OUTPATIENT
Start: 2022-07-25 | End: 2022-07-25

## 2022-07-25 RX ORDER — FENTANYL CITRATE 50 UG/ML
25 INJECTION, SOLUTION INTRAMUSCULAR; INTRAVENOUS
Status: DISCONTINUED | OUTPATIENT
Start: 2022-07-25 | End: 2022-07-25 | Stop reason: CLARIF

## 2022-07-25 RX ORDER — ATROPINE SULFATE 0.1 MG/ML
0.5 INJECTION INTRAVENOUS
Status: ACTIVE | OUTPATIENT
Start: 2022-07-25 | End: 2022-07-25

## 2022-07-25 RX ORDER — NITROGLYCERIN 0.4 MG/1
TABLET SUBLINGUAL
Qty: 25 TABLET | Refills: 3 | Status: SHIPPED | OUTPATIENT
Start: 2022-07-25 | End: 2022-07-25

## 2022-07-25 RX ORDER — FENTANYL CITRATE 50 UG/ML
INJECTION, SOLUTION INTRAMUSCULAR; INTRAVENOUS
Status: DISCONTINUED | OUTPATIENT
Start: 2022-07-25 | End: 2022-07-25 | Stop reason: HOSPADM

## 2022-07-25 RX ORDER — ATORVASTATIN CALCIUM 40 MG/1
40 TABLET, FILM COATED ORAL DAILY
Status: DISCONTINUED | OUTPATIENT
Start: 2022-07-25 | End: 2022-07-25

## 2022-07-25 RX ORDER — ACETAMINOPHEN 325 MG/1
650 TABLET ORAL EVERY 4 HOURS PRN
Status: DISCONTINUED | OUTPATIENT
Start: 2022-07-25 | End: 2022-07-27 | Stop reason: HOSPADM

## 2022-07-25 RX ORDER — ASPIRIN 81 MG/1
81 TABLET ORAL DAILY
Status: DISCONTINUED | OUTPATIENT
Start: 2022-07-25 | End: 2022-07-25

## 2022-07-25 RX ADMIN — ISOSORBIDE MONONITRATE 30 MG: 30 TABLET, EXTENDED RELEASE ORAL at 16:57

## 2022-07-25 RX ADMIN — METOPROLOL SUCCINATE 25 MG: 25 TABLET, EXTENDED RELEASE ORAL at 16:57

## 2022-07-25 RX ADMIN — ASPIRIN 81 MG CHEWABLE TABLET 243 MG: 81 TABLET CHEWABLE at 11:17

## 2022-07-25 RX ADMIN — DIAZEPAM 5 MG: 5 TABLET ORAL at 12:04

## 2022-07-25 RX ADMIN — LISINOPRIL 10 MG: 5 TABLET ORAL at 07:59

## 2022-07-25 RX ADMIN — PANTOPRAZOLE SODIUM 40 MG: 40 TABLET, DELAYED RELEASE ORAL at 07:59

## 2022-07-25 RX ADMIN — ASPIRIN 81 MG CHEWABLE TABLET 81 MG: 81 TABLET CHEWABLE at 07:51

## 2022-07-25 RX ADMIN — NITROGLYCERIN 10 MCG/MIN: 20 INJECTION INTRAVENOUS at 02:43

## 2022-07-25 RX ADMIN — NITROGLYCERIN 0.4 MG: 0.4 TABLET, ORALLY DISINTEGRATING SUBLINGUAL at 00:19

## 2022-07-25 RX ADMIN — ATORVASTATIN CALCIUM 40 MG: 40 TABLET, FILM COATED ORAL at 20:22

## 2022-07-25 RX ADMIN — Medication 162 MG: at 00:19

## 2022-07-25 ASSESSMENT — ACTIVITIES OF DAILY LIVING (ADL)
FALL_HISTORY_WITHIN_LAST_SIX_MONTHS: NO
ADLS_ACUITY_SCORE: 37
ADLS_ACUITY_SCORE: 37
CONCENTRATING,_REMEMBERING_OR_MAKING_DECISIONS_DIFFICULTY: NO
ADLS_ACUITY_SCORE: 35
DOING_ERRANDS_INDEPENDENTLY_DIFFICULTY: NO
ADLS_ACUITY_SCORE: 38
CHANGE_IN_FUNCTIONAL_STATUS_SINCE_ONSET_OF_CURRENT_ILLNESS/INJURY: NO
DRESSING/BATHING_DIFFICULTY: NO
ADLS_ACUITY_SCORE: 37
DIFFICULTY_EATING/SWALLOWING: NO
WEAR_GLASSES_OR_BLIND: NO
ADLS_ACUITY_SCORE: 37
ADLS_ACUITY_SCORE: 38
ADLS_ACUITY_SCORE: 38
WALKING_OR_CLIMBING_STAIRS_DIFFICULTY: NO
TOILETING_ISSUES: NO
ADLS_ACUITY_SCORE: 38
ADLS_ACUITY_SCORE: 37

## 2022-07-25 ASSESSMENT — EJECTION FRACTION: EF_VALUE: .23

## 2022-07-25 NOTE — DISCHARGE INSTRUCTIONS

## 2022-07-25 NOTE — ED NOTES
"Critical lab: troponin 6.24. Provider notified. Started nitroglycerin drip, as pt continues to have mild \"2-3/10\" tightness to chest. Monitoring VS q5min.    "

## 2022-07-25 NOTE — PROVIDER NOTIFICATION
Spoke with Dr. Silvestre, as pt had nitroglycerin gtt ordered to start and pt BP 87/50, map 63. Provider discussed to hold off on nitroglycerine gtt, changed to SL. Restart nitroglycerin gtt if map >65. Notify provider if map <60. Also additional aspirin ordered as well.

## 2022-07-25 NOTE — PROGRESS NOTES
Patient prepped for procedure. He is here for CA due to chest pain, NSTEMI. Arrived via wheelchair from ED accompanied by wife, Beata. Able to ask questions. Consents are signed and obtained by Britany. Pt prepped and ready for the procedure.    Pt reports intermittent chest pains since arriving to hospital last night, no chest pain currently. Pt continues on heparin drip at 1200 units/hr.

## 2022-07-25 NOTE — PROGRESS NOTES
"Assessment & Plan   72-year-old male with a history of hypertension, GERD presented with chest pain found to have NSTEMI.    Active Problems:    NSTEMI (non-ST elevated myocardial infarction) (H)    CAD (coronary artery disease)    Sinus bradycardia    Benign essential hypertension    Gastroesophageal reflux disease without esophagitis    Hyponatremia    Present on Admission:    NSTEMI (non-ST elevated myocardial infarction) (H)      NSTEMI, RCA stenosis   -Patient presented with 2 days of intermittent chest pain.  He EKG found to have bradycardia and RBBB but patient with elevated troponin 1.3 --> 3.77 --> 6.24 -> 7.53.  Patient initiated on heparin infusion as well as Nitropaste.  Cardiac catheterization with 100% stenosis of mid RCA.  Recommending cardiac MRI to further evaluate tissue viability and if PCI would be warranted.  -Cardiac telemetry  -ASA 81 mg p.o. daily  -Atorvastatin 40 mg p.o. daily  -Isosorbide mononitrate 30 mg p.o. daily  -TTE pending  -Potential cardiac MRI this hospitalization, defer to cardiology  -Cardiology consulted, appreciate recommendation    Sinus bradycardia  -Seen on EKG.  Will avoid beta-blockers at this time.    HTN  -Lisinopril 10 mg p.o. daily    GERD  -Pantoprazole 40 mg p.o. daily    Hyponatremia  -Mild, likely hypovolemic hyponatremia  -Check TSH, cortisol     Clinically Significant Risk Factors Present on Admission                 # Hypertension: home medication list includes antihypertensive(s)    # Overweight: Estimated body mass index is 26.9 kg/m  as calculated from the following:    Height as of this encounter: 1.93 m (6' 4\").    Weight as of this encounter: 100.2 kg (221 lb).        Electrolytes: sodium 133  Fluids: po  Diet: cardiac after angio  VTE prophylaxis: SCD boots    COVID19 symptom check 7/25/2022  Fevers/Chills/myalgias: NEGATIVE TO ALL  Sick contacts/COVID19 exposure: NEGATIVE TO ALL  Cough/trouble breathing/SOB/sore throat: NEGATIVE TO ALL  Diarrhea: " NEGATIVE       Expected Discharge Date: 07/26/2022                Subjective  Cc: chest pain    Pt is new to be today.  Personally conducted extensive chart review prior to visit including labs, imaging, past provider notes and interventions.  Patient endorses feeling just fine, 9-year-old little bit of chest pain leading up to the coronary angiogram but currently feeling well.  Denies shortness of breath, abdominal pain, looking forward to being able to eat a little bit.    Review of Systems: History obtained from the patient  General ROS: negative  for  - chills, fatigue, fever  ENT ROS: negative for - headaches, hearing change, nasal congestion, nasal discharge  Hematological and Lymphatic ROS: negative for - bleeding problems, blood clots, bruising  Respiratory ROS: negative for - cough, pleuritic pain, shortness of breath  Cardiovascular ROS: Positive for -mild chest pain, negative for dyspnea on exertion, edema  Gastrointestinal ROS: negative for - abdominal pain, constipation, diarrhea, and nausea/vomiting  Genito-Urinary ROS: negative for - change in urinary stream, dysuria, hematuria  Musculoskeletal ROS: negative for - gait disturbance, muscle pain  Neurological ROS: negative for - behavioral changes, confusion, dizziness, numbness/tingling   Dermatological ROS: negative for pruritus, rash    Objective    Physical Examination:   General appearance - alert, well appearing, and in no distress and oriented to person, place, and time  Mental status - alert, oriented to person, place, and time, normal mood, behavior, speech, dress, motor activity, and thought processes  HEENT - sclera anicteric, left eye normal, right eye normal, nares normal and patent, no erythema  Respiratory - clear to auscultation, no wheezes, rales or rhonchi, symmetric air entry, no tachypnea, retractions or cyanosis  Cardiac - normal rate, regular rhythm, normal S1, S2, no murmurs, rubs, clicks or gallops, no JVD  Abdomen - soft,  nontender, nondistended, no masses or organomegaly no rebound tenderness noted bowel sounds normal, no bladder distension noted  Neurological - alert, oriented, normal speech, no focal findings or movement disorder noted  Musculoskeletal - no joint tenderness, deformity or swelling, full range of motion without pain  Extremities - peripheral pulses normal, no pedal edema, no clubbing or cyanosis  Skin - normal coloration and turgor, no rashes, no suspicious skin lesions noted    Temp:  [96.8  F (36  C)-98.4  F (36.9  C)] 98.4  F (36.9  C)  Pulse:  [48-69] 62  Resp:  [10-24] 20  BP: ()/(56-87) 116/74  SpO2:  [93 %-100 %] 100 %      Intake/Output Summary (Last 24 hours) at 7/25/2022 0708  Last data filed at 7/25/2022 0019  Gross per 24 hour   Intake 3 ml   Output 275 ml   Net -272 ml       Results    Recent Results (from the past 24 hour(s))   ECG 12-LEAD WITH MUSE ([facility])    Collection Time: 07/24/22  4:52 PM   Result Value Ref Range    Systolic Blood Pressure  mmHg    Diastolic Blood Pressure  mmHg    Ventricular Rate 57 BPM    Atrial Rate 57 BPM    NH Interval 156 ms    QRS Duration 106 ms     ms    QTc 393 ms    P Axis 64 degrees    R AXIS -6 degrees    T Axis 5 degrees    Interpretation ECG       Sinus bradycardia  Possible Left atrial enlargement  Incomplete right bundle branch block  Borderline ECG  No previous ECGs available  Confirmed by SEE ED PROVIDER NOTE FOR, ECG INTERPRETATION ([id number]),  SARAY HERNANDEZ ([id number]) on 7/25/2022 10:46:20 AM     Extra Blue Top Tube    Collection Time: 07/24/22  4:57 PM   Result Value Ref Range    Hold Specimen JIC    Extra Red Top Tube    Collection Time: 07/24/22  4:57 PM   Result Value Ref Range    Hold Specimen JIC    Extra Green Top (Lithium Heparin) Tube    Collection Time: 07/24/22  4:57 PM   Result Value Ref Range    Hold Specimen JIC    Extra Purple Top Tube    Collection Time: 07/24/22  4:57 PM   Result Value Ref Range    Hold Specimen JIC    CBC (+  platelets, no diff)    Collection Time: 07/24/22  4:57 PM   Result Value Ref Range    WBC Count 6.7 4.0 - 11.0 10e3/uL    RBC Count 4.94 4.40 - 5.90 10e6/uL    Hemoglobin 15.1 13.3 - 17.7 g/dL    Hematocrit 44.1 40.0 - 53.0 %    MCV 89 78 - 100 fL    MCH 30.6 26.5 - 33.0 pg    MCHC 34.2 31.5 - 36.5 g/dL    RDW 12.3 10.0 - 15.0 %    Platelet Count 218 150 - 450 10e3/uL   Troponin I (now)    Collection Time: 07/24/22  4:57 PM   Result Value Ref Range    Troponin I 1.31 () 0.00 - 0.29 ng/mL   B-Type Natriuretic Peptide ([facility] Only)    Collection Time: 07/24/22  4:57 PM   Result Value Ref Range    BNP 24 0 - 67 pg/mL   INR    Collection Time: 07/24/22  4:57 PM   Result Value Ref Range    INR 1.01 0.85 - 1.15   Adult Type and Screen    Collection Time: 07/24/22  4:57 PM   Result Value Ref Range    ABO/RH(D) O POS     Antibody Screen Negative Negative    SPECIMEN EXPIRATION DATE [id number]    PTT    Collection Time: 07/24/22  7:44 PM   Result Value Ref Range    aPTT 33 22 - 38 Seconds   ECG 12-LEAD WITH MUSE (L[facility])    Collection Time: 07/24/22  9:41 PM   Result Value Ref Range    Systolic Blood Pressure 130 mmHg    Diastolic Blood Pressure 86 mmHg    Ventricular Rate 56 BPM    Atrial Rate 56 BPM    NJ Interval 164 ms    QRS Duration 100 ms     ms    QTc 389 ms    P Axis 72 degrees    R AXIS 58 degrees    T Axis 67 degrees    Interpretation ECG       Sinus bradycardia with occasional Premature ventricular complexes  Incomplete right bundle branch block  Borderline ECG  When compared with ECG of 24-JUL-2022 16:52,  Premature ventricular complexes are now Present  Questionable change in QRS axis  T wave inversion no longer evident in Inferior leads  T wave amplitude has decreased in Lateral leads  Confirmed by SEE ED PROVIDER NOTE FOR, ECG INTERPRETATION ([id number]),  SARAY HERNANDEZ ([id number]) on 7/25/2022 10:46:42 AM     Basic metabolic panel    Collection Time: 07/24/22  9:44 PM   Result Value Ref Range     Sodium 133 (L) 136 - 145 mmol/L    Potassium 4.3 3.5 - 5.0 mmol/L    Chloride 100 98 - 107 mmol/L    Carbon Dioxide (CO2) 26 22 - 31 mmol/L    Anion Gap 7 5 - 18 mmol/L    Urea Nitrogen 13 8 - 28 mg/dL    Creatinine 0.84 0.70 - 1.30 mg/dL    Calcium 9.3 8.5 - 10.5 mg/dL    Glucose 105 70 - 125 mg/dL    GFR Estimate >90 >60 mL/min/1.73m2   Hepatic function panel    Collection Time: 07/24/22  9:44 PM   Result Value Ref Range    Bilirubin Total 0.6 0.0 - 1.0 mg/dL    Bilirubin Direct 0.2 <=0.5 mg/dL    Protein Total 7.1 6.0 - 8.0 g/dL    Albumin 3.8 3.5 - 5.0 g/dL    Alkaline Phosphatase 58 45 - 120 U/L    AST 42 (H) 0 - 40 U/L    ALT 26 0 - 45 U/L   Lipase    Collection Time: 07/24/22  9:44 PM   Result Value Ref Range    Lipase 12 0 - 52 U/L   Troponin I (now)    Collection Time: 07/24/22  9:44 PM   Result Value Ref Range    Troponin I 3.77 (HH) 0.00 - 0.29 ng/mL   Asymptomatic COVID-19 Virus (Coronavirus) by PCR Nasopharyngeal    Collection Time: 07/24/22 10:07 PM    Specimen: Nasopharyngeal; Swab   Result Value Ref Range    SARS CoV2 PCR Negative Negative   Troponin I    Collection Time: 07/25/22  1:32 AM   Result Value Ref Range    Troponin I 6.24 (HH) 0.00 - 0.29 ng/mL   Heparin Unfractionated Anti Xa Level    Collection Time: 07/25/22  3:56 AM   Result Value Ref Range    Anti Xa Unfractionated Heparin 0.25 For Reference Range, See Comment IU/mL   Lipid panel reflex to direct LDL    Collection Time: 07/25/22  3:56 AM   Result Value Ref Range    Cholesterol 184 <=199 mg/dL    Triglycerides 171 (H) <=149 mg/dL    Direct Measure HDL 31 (L) >=40 mg/dL    LDL Cholesterol Calculated 119 <=129 mg/dL   Basic metabolic panel    Collection Time: 07/25/22  3:56 AM   Result Value Ref Range    Sodium 133 (L) 136 - 145 mmol/L    Potassium 4.2 3.5 - 5.0 mmol/L    Chloride 101 98 - 107 mmol/L    Carbon Dioxide (CO2) 24 22 - 31 mmol/L    Anion Gap 8 5 - 18 mmol/L    Urea Nitrogen 13 8 - 28 mg/dL    Creatinine 0.79 0.70 - 1.30  mg/dL    Calcium 9.0 8.5 - 10.5 mg/dL    Glucose 105 70 - 125 mg/dL    GFR Estimate >90 >60 mL/min/1.73m2   TSH    Collection Time: 07/25/22  3:56 AM   Result Value Ref Range    TSH 2.04 0.30 - 5.00 uIU/mL   Cortisol    Collection Time: 07/25/22  3:56 AM   Result Value Ref Range    Cortisol 17.6 ug/dL   Troponin I    Collection Time: 07/25/22  7:55 AM   Result Value Ref Range    Troponin I 7.53 (HH) 0.00 - 0.29 ng/mL      Conclusion      Left ventricular filling pressures are normal.    Prox LAD to Mid LAD lesion is 20% stenosed.    Prox RCA to Mid RCA lesion is 100% stenosed.    Global LV function mild reduction with akintis inferobase        Lab Results personally reviewed 7/25  Imaging Results personally reviewed 7/25  Discussed with patient and his wife  Reviewed old records     Advanced Care Planning  Discharge Planning discussed with patient and family  Discussed care with patient and family for 35 minutes with greater than 50% of time spent in counseling and coordination of care.    Updated patient's wife Beata at bedside on 7/25    Sharon Tapia DO  Hospitalist Service  [facility]  Text page via [employer] Paging/Directory     This note was created using dragon dictation, any spelling and grammatical errors are unintentional.

## 2022-07-25 NOTE — PROGRESS NOTES
Care Management Note    Length of Stay (days): 1    Expected Discharge Date:   To be determined.        Concerns to be Addressed:   Heparin drip, Nitroglycerine drip; NPO for cath lab.  Patient plan of care discussed at interdisciplinary rounds: Yes    Anticipated Discharge Disposition:  Discharge goal is home pending response to treatment, medical needs and mobility closer to discharge.      Anticipated Discharge Services:  None anticipated  Anticipated Discharge DME:  No new DME anticipated.     Patient/family educated on Medicare website which has current facility and service quality ratings:  NA  Education Provided on the Discharge Plan:  Per team  Patient/Family in Agreement with the Plan:  yes    Referrals Placed by CM/SW:  none  Private pay costs discussed: Not applicable     Additional Information:  Per chart review (patient is in [facility] currently, patient is  and independent with activities of daily living at baseline.  No care management needs identified at this time but CM will continue to monitor progression of care, review team recommendations and provide discharge planning assist as needed.        Lynnette Darnell RN

## 2022-07-25 NOTE — H&P
"[facility]    History and Physical - Hospitalist Service       Date of Admission:  7/24/2022    Assessment & Plan      Dilip Frye is a 72 year old male with no previous history of coronary artery disease admitted to the hospital with 2 days of chest pain, found to have NSTEMI    NSTEMI  --- No acute changes on EKG.  --- Begin heparin drip  --- On Nitropaste, if chest pain not completely resolved we will change to nitro drip  --- Continue to trend troponin  --- N.p.o. after midnight  --- Cardiology aware, consult in the morning  --- Previously was on atenolol.  Not listed in home meds and patient denies using.  Hold off on further beta-blocker given bradycardia    Bradycardia  --- Not on home beta-blocker   --- Previously on atenolol;   --- Hold off on beta-blocker overnight   ---continue telemetry monitoring    Hypertension  --- Blood pressures appear controlled   ---continue lisinopril with hold parameters  --- On atenolol    Hyponatremia--mild.  Continue to monitor           Diet: NPO for Medical/Clinical Reasons Except for: Meds  Clear Liquid Diet    DVT Prophylaxis: heparin sub q  Guzman Catheter: Not present  Central Lines: None  Code Status: Full Code    Clinically Significant Risk Factors Present on Admission                 # Hypertension: home medication list includes antihypertensive(s)    # Overweight: Estimated body mass index is 26.9 kg/m  as calculated from the following:    Height as of this encounter: 1.93 m (6' 4\").    Weight as of this encounter: 100.2 kg (221 lb).        Disposition Plan   Anticipate discharge in 2 days  The patient's care was discussed with the Patient and Patient's Family.    Adela Silvestre MD  [facility]  Securely message with the Vocera Web Console (learn more here)  Text page via [employer] Paging/Directory      ______________________________________________________________________    Chief Complaint     Chest pain  History is " obtained from the patient    History of Present Illness   Dilip Frye is a 72 year old male with a history of hypertension and no known coronary artery disease came into the emergency room department for further work-up and evaluation of 2-day history of intermittent chest pain.  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  It radiated into both sides of his chest and into his right arm.  He went into his truck and after about 30 minutes it went away.  He went home and felt okay.  Today he noted that with any activity it was coming on and staying for longer longer periods of time.  He denies any nausea diaphoresis vomiting diarrhea.  Denies any fever.  Denies any melena hematochezia.  He became concerned due to ongoing intermittent chest pain and came to the emergency room department where EKG is nonischemic but troponin is elevated to 1.31 and he is being admitted.  He does have a history of GERD but feels that this is different than previous GERD pain    Cardiac risk factors; hypertension.  Denies known hyperlipidemia, family history, tobacco abuse, or diabetes    Review of Systems    The 10 point Review of Systems is negative other than noted in the HPI     Past Medical History    I have reviewed this patient's medical history and updated it with pertinent information if needed.   Past Medical History:   Diagnosis Date     Benign essential hypertension      Gastroesophageal reflux disease with esophagitis        Past Surgical History   I have reviewed this patient's surgical history and updated it with pertinent information if needed.  Past Surgical History:   Procedure Laterality Date     CERVICAL LAMINECTOMY         Social History   I have reviewed this patient's social history and updated it with pertinent information if needed.  Social History     Tobacco Use     Smoking  status: Never Smoker     Smokeless tobacco: Never Used   Substance Use Topics     Alcohol use: Yes     Alcohol/week: 1.0 standard drink     Comment: Alcoholic Drinks/day: One drink twice a month per pt     Drug use: No       Family History   I have reviewed this patient's family history and updated it with pertinent information if needed.  Family History   Problem Relation Age of Onset     Brain Cancer Mother      No Known Problems Father      Coronary Artery Disease Early Onset No family hx of        Prior to Admission Medications   Prior to Admission Medications   Prescriptions Last Dose Informant Patient Reported? Taking?   cholecalciferol (VITAMIN D3) 125 mcg (5000 units) capsule 7/24/2022 at Unknown time  Yes Yes   Sig: Take 125 mcg by mouth daily   lisinopril (PRINIVIL,ZESTRIL) 10 MG tablet 7/24/2022 at Unknown time  Yes Yes   Sig: [LISINOPRIL (PRINIVIL,ZESTRIL) 10 MG TABLET] Take 10 mg by mouth daily.   pantoprazole (PROTONIX) 40 MG tablet 7/24/2022 at Unknown time  Yes Yes   Sig: [PANTOPRAZOLE (PROTONIX) 40 MG TABLET] Take 40 mg by mouth daily.   vitamin E (TOCOPHEROL) 400 units (180 mg) capsule 7/24/2022 at Unknown time  Yes Yes   Sig: Take 400 Units by mouth daily      Facility-Administered Medications: None     Allergies   No Known Allergies    Physical Exam   Vital Signs: Temp: 96.8  F (36  C) Temp src: Tympanic BP: 104/67 Pulse: 52   Resp: 20 SpO2: 97 %      Weight: 221 lbs 0 oz    General Appearance: pleasant male, NAD  Eyes: EOMI  HEENT: Oropharynx moist and tongue midline  Respiratory: Clear to auscultation bilaterally without wheezes crackles or rhonchi  Cardiovascular: Regular rate and rhythm without murmurs rubs or gallops  GI: Soft and nontender without hepatosplenomegaly  Skin: No significant lower extremity edema open areas or skin rashes  Musculoskeletal: No joint swelling  Neurologic: Neurologically grossly intact without focal deficits appreciated    Data   Data reviewed today: I reviewed  all medications, new labs and imaging results over the last 24 hours. I personally reviewed the EKG tracing showing sinus bradycardia.  QTc 393.  No acute ST changes..    Chest XR,  PA & LAT   Final Result   IMPRESSION: Negative chest.          Recent Results (from the past 24 hour(s))   Extra Blue Top Tube    Collection Time: 07/24/22  4:57 PM   Result Value Ref Range    Hold Specimen JIC    Extra Red Top Tube    Collection Time: 07/24/22  4:57 PM   Result Value Ref Range    Hold Specimen JIC    Extra Green Top (Lithium Heparin) Tube    Collection Time: 07/24/22  4:57 PM   Result Value Ref Range    Hold Specimen JIC    Extra Purple Top Tube    Collection Time: 07/24/22  4:57 PM   Result Value Ref Range    Hold Specimen JIC    CBC (+ platelets, no diff)    Collection Time: 07/24/22  4:57 PM   Result Value Ref Range    WBC Count 6.7 4.0 - 11.0 10e3/uL    RBC Count 4.94 4.40 - 5.90 10e6/uL    Hemoglobin 15.1 13.3 - 17.7 g/dL    Hematocrit 44.1 40.0 - 53.0 %    MCV 89 78 - 100 fL    MCH 30.6 26.5 - 33.0 pg    MCHC 34.2 31.5 - 36.5 g/dL    RDW 12.3 10.0 - 15.0 %    Platelet Count 218 150 - 450 10e3/uL   Troponin I (now)    Collection Time: 07/24/22  4:57 PM   Result Value Ref Range    Troponin I 1.31 () 0.00 - 0.29 ng/mL   B-Type Natriuretic Peptide ([facility] Only)    Collection Time: 07/24/22  4:57 PM   Result Value Ref Range    BNP 24 0 - 67 pg/mL   INR    Collection Time: 07/24/22  4:57 PM   Result Value Ref Range    INR 1.01 0.85 - 1.15   PTT    Collection Time: 07/24/22  7:44 PM   Result Value Ref Range    aPTT 33 22 - 38 Seconds   Basic metabolic panel    Collection Time: 07/24/22  9:44 PM   Result Value Ref Range    Sodium 133 (L) 136 - 145 mmol/L    Potassium 4.3 3.5 - 5.0 mmol/L    Chloride 100 98 - 107 mmol/L    Carbon Dioxide (CO2) 26 22 - 31 mmol/L    Anion Gap 7 5 - 18 mmol/L    Urea Nitrogen 13 8 - 28 mg/dL    Creatinine 0.84 0.70 - 1.30 mg/dL    Calcium 9.3 8.5 - 10.5 mg/dL    Glucose 105 70 - 125 mg/dL     GFR Estimate >90 >60 mL/min/1.73m2   Hepatic function panel    Collection Time: 07/24/22  9:44 PM   Result Value Ref Range    Bilirubin Total 0.6 0.0 - 1.0 mg/dL    Bilirubin Direct 0.2 <=0.5 mg/dL    Protein Total 7.1 6.0 - 8.0 g/dL    Albumin 3.8 3.5 - 5.0 g/dL    Alkaline Phosphatase 58 45 - 120 U/L    AST 42 (H) 0 - 40 U/L    ALT 26 0 - 45 U/L   Lipase    Collection Time: 07/24/22  9:44 PM   Result Value Ref Range    Lipase 12 0 - 52 U/L   Troponin I (now)    Collection Time: 07/24/22  9:44 PM   Result Value Ref Range    Troponin I 3.77 (HH) 0.00 - 0.29 ng/mL   Asymptomatic COVID-19 Virus (Coronavirus) by PCR Nasopharyngeal    Collection Time: 07/24/22 10:07 PM    Specimen: Nasopharyngeal; Swab   Result Value Ref Range    SARS CoV2 PCR Negative Negative

## 2022-07-25 NOTE — PLAN OF CARE
Problem: Chest Pain  Goal: Resolution of Chest Pain Symptoms  Intervention: Manage Acute Chest Pain  Recent Flowsheet Documentation  Taken 7/25/2022 0348 by Mamta Lechuga, RN  Chest Pain Intervention: cardiac monitoring continued  Taken 7/25/2022 0046 by Mamta Lechuga, RN  Chest Pain Intervention: cardiac monitoring continued     Problem: Plan of Care - These are the overarching goals to be used throughout the patient stay.    Goal: Optimal Comfort and Wellbeing  Outcome: Ongoing, Progressing  Elevated troponin. Awaiting cardiology consult. Nitroglycerine drip started overnight. Per Dr. Silvestre, to keep MAP >60 with nitroglycerin gtt. Pt has remained stable with initial dose since starting (10 mcg/hr), but will require ICU transfer if any titration is needed.   Sinus lorenzo with occasional PVCs on tele.   Stopped nitroglycerin infusion around 0600 for no c/o CP. VSS.

## 2022-07-25 NOTE — PLAN OF CARE
PRIMARY DIAGNOSIS: CHEST PAIN  OUTPATIENT/OBSERVATION GOALS TO BE MET BEFORE DISCHARGE:  1. Ruled out acute coronary syndrome (negative or stable Troponin):   Unstable troponin  2. Pain Status: Continuous nitroglycerin started.   3. Appropriate provocative testing performed: No, requiring cath lab intervention  - Stress Test Procedure: NA  - Interpretation of cardiac rhythm per telemetry tech: sinus lorenzo with occasional PVCs.    4. Cleared by Consultants (if applicable):No  5. Return to near baseline physical activity: No  Discharge Planner Nurse   Safe discharge environment identified: Yes  Barriers to discharge: Yes       Entered by: Mamta Lechuga RN 07/25/2022 5:23 AM       Please review provider order for any additional goals.   Nurse to notify provider when observation goals have been met and patient is ready for discharge.  Goal Outcome Evaluation:

## 2022-07-25 NOTE — CONSULTS
"HEART CARE NOTE        Thank you, Dr. Tapia, for asking the [facility] Heart Care team to see Dilip Frye to evaluate chest pain c/b NSTEMI.      Assessment/Recommendations     1. Chest pain c/b NSTEMI  Assessment / Plan    Patient reports progressive stuttering chest pain exacerbated by exertion and relieved by rest; troponin noted to be positive x2     Will proceed with coronary angiogram +/- PCI. I discussed this with him and his wife including potential risk of stroke, myocardial infarction, or death.  We also discussed the possibility of vascular injury, bleeding requiring blood transfusion, or reaction to x-ray dye    Continue ASA, atorvastatin, lisinopril; BBlocker on hold 2/2 bradycardia    Continue heparin and nitroglycerin gtt    2. HTN  Assessment / Plan    Adequately controlled on current regimen - no changes at this time      Clinically Significant Risk Factors Present on Admission                             History of Present Illness/Subjective    Mr. Dilip Frye is a 72 year old male with a PMHx significant for HTN who presents with NSTEMI.    Today, Mr. Frye denies any current chest pain, palpitations, lightheadedness/dizziness, dyspnea. He describes the pain that prompted her ED visit as 5-6/10, approx a 30 in duration initially on Saturday afternoon which resolved with rest. He reports that the chest discomfort radiated to his left jaw and was associated with dyspnea and these symptoms returned with exertion throughout the weekend, He was prompted to present to the ED when the symptoms started to occur with minimal exertions; management plan as detailed above       ECG: Personally reviewed. sinus bradycardia, occasional PVC noted, unifocal.    ECHO: results pending        Physical Examination Review of Systems   BP 99/69   Pulse 59   Temp 96.8  F (36  C) (Tympanic)   Resp 20   Ht 1.93 m (6' 4\")   Wt 100.2 kg (221 lb)   SpO2 94%   BMI 26.90 kg/m    Body mass index is 26.9 " kg/m .  Wt Readings from Last 3 Encounters:   07/24/22 100.2 kg (221 lb)   12/10/14 107.5 kg (237 lb)   12/16/14 107 kg (236 lb)     General Appearance:   no distress, normal body habitus   ENT/Mouth: membranes moist, no oral lesions or bleeding gums.      EYES:  no scleral icterus, normal conjunctivae   Neck: no carotid bruits or thyromegaly   Chest/Lungs:   lungs are clear to auscultation, no rales or wheezing, equal chest wall expansion    Cardiovascular:   Regular. Normal first and second heart sounds with no murmurs, rubs, or gallops; the carotid, radial and posterior tibial pulses are intact, no JVD or LE edema bilaterally    Abdomen:  no organomegaly, masses, bruits, or tenderness; bowel sounds are present   Extremities: no cyanosis or clubbing   Skin: no xanthelasma, warm.    Neurologic: alert and oriented x3, calm     Psychiatric: alert and oriented x3, calm     A complete 10 systems ROS was reviewed  And is negative except what is listed in the HPI.          Medical History  Surgical History Family History Social History   Past Medical History:   Diagnosis Date     Benign essential hypertension      Gastroesophageal reflux disease with esophagitis     Past Surgical History:   Procedure Laterality Date     CERVICAL LAMINECTOMY      no family history of premature coronary artery disease Social History     Socioeconomic History     Marital status:      Spouse name: Not on file     Number of children: Not on file     Years of education: Not on file     Highest education level: Not on file   Occupational History     Not on file   Tobacco Use     Smoking status: Never Smoker     Smokeless tobacco: Never Used   Substance and Sexual Activity     Alcohol use: Yes     Alcohol/week: 1.0 standard drink     Comment: Alcoholic Drinks/day: One drink twice a month per pt     Drug use: No     Sexual activity: Not on file   Other Topics Concern     Not on file   Social History Narrative    Patient still works heavy  physical labor.  He is .     Social Determinants of Health     Financial Resource Strain: Not on file   Food Insecurity: Not on file   Transportation Needs: Not on file   Physical Activity: Not on file   Stress: Not on file   Social Connections: Not on file   Intimate Partner Violence: Not on file   Housing Stability: Not on file           Lab Results    Chemistry/lipid CBC Cardiac Enzymes/BNP/TSH/INR   Lab Results   Component Value Date    CHOL 184 07/25/2022    HDL 31 (L) 07/25/2022    TRIG 171 (H) 07/25/2022    BUN 13 07/25/2022     (L) 07/25/2022    CO2 24 07/25/2022    Lab Results   Component Value Date    WBC 6.7 07/24/2022    HGB 15.1 07/24/2022    HCT 44.1 07/24/2022    MCV 89 07/24/2022     07/24/2022    Lab Results   Component Value Date    TROPONINI 6.24 (HH) 07/25/2022    BNP 24 07/24/2022    INR 1.01 07/24/2022     Lab Results   Component Value Date    TROPONINI 6.24 (HH) 07/25/2022          Weight:    Wt Readings from Last 3 Encounters:   07/24/22 100.2 kg (221 lb)   12/10/14 107.5 kg (237 lb)   12/16/14 107 kg (236 lb)       Allergies  No Known Allergies      Surgical History  Past Surgical History:   Procedure Laterality Date     CERVICAL LAMINECTOMY         Social History  Tobacco:   History   Smoking Status     Never Smoker   Smokeless Tobacco     Never Used    Alcohol:   Social History    Substance and Sexual Activity      Alcohol use: Yes        Alcohol/week: 1.0 standard drink        Comment: Alcoholic Drinks/day: One drink twice a month per pt   Illicit Drugs:   History   Drug Use No       Family History  Family History   Problem Relation Age of Onset     Brain Cancer Mother      No Known Problems Father      Coronary Artery Disease Early Onset No family hx of           Syed Antony MD on 7/25/2022      cc: Roshan Sandoval,

## 2022-07-25 NOTE — PROGRESS NOTES
Patient was kept comfortable during post-procedure stay.VSS. Denies pain. Right radial access site remains dry & free from signs of bleeding. Appointments made & included in AVS.   Pt able to eat, drink, ambulate and void without problem post-procedure.  Post-op instructions given to patient & spouse regarding use of right arm/restrictions. Able to ask questions. Verbalized no concerns. Belongings returned. Report called to [facility] and pt Transferred to P3 in stable condition.

## 2022-07-25 NOTE — PHARMACY-ADMISSION MEDICATION HISTORY
Pharmacy Note - Admission Medication History    Pertinent Provider Information:      ______________________________________________________________________    Prior To Admission (PTA) med list completed and updated in EMR.       PTA Med List   Medication Sig Last Dose     cholecalciferol (VITAMIN D3) 125 mcg (5000 units) capsule Take 125 mcg by mouth daily 7/24/2022 at Unknown time     lisinopril (PRINIVIL,ZESTRIL) 10 MG tablet [LISINOPRIL (PRINIVIL,ZESTRIL) 10 MG TABLET] Take 10 mg by mouth daily. 7/24/2022 at Unknown time     pantoprazole (PROTONIX) 40 MG tablet [PANTOPRAZOLE (PROTONIX) 40 MG TABLET] Take 40 mg by mouth daily. 7/24/2022 at Unknown time     vitamin E (TOCOPHEROL) 400 units (180 mg) capsule Take 400 Units by mouth daily 7/24/2022 at Unknown time       Information source(s): Patient and CareEverywhere/[facility]  Method of interview communication: in-person    Summary of Changes to PTA Med List  New: vitamin E, vitamin D  Discontinued: aspirin, atenolol  Changed: none    Patient was asked about OTC/herbal products specifically.  PTA med list reflects this.    In the past week, patient estimated taking medication this percent of the time:  greater than 90%.    Allergies were reviewed, assessed, and updated with the patient.      Patient does not use any multi-dose medications prior to admission.    The information provided in this note is only as accurate as the sources available at the time of the update(s).    Thank you for the opportunity to participate in the care of this patient.    Kiana Galeas RPH  7/24/2022 10:53 PM

## 2022-07-25 NOTE — PRE-PROCEDURE
GENERAL PRE-PROCEDURE:   Procedure:  Coronary angiogram with possible intervention   Date/Time:  7/25/2022 11:00 AM    Written consent obtained?: Yes    Risks and benefits: Risks, benefits and alternatives were discussed    Consent given by:  Patient  Patient states understanding of procedure being performed: Yes    Patient's understanding of procedure matches consent: Yes    Procedure consent matches procedure scheduled: Yes    Expected level of sedation:  Moderate  Appropriately NPO:  Yes  ASA Class:  3 (intermittent CP, bradycardia, RBBB, NSTEMI, HTN, GERD)  Mallampati  :  Grade 2- soft palate, base of uvula, tonsillar pillars, and portion of posterior pharyngeal wall visible  Lungs:  Lungs clear with good breath sounds bilaterally  Heart:  Normal heart sounds and rate  History & Physical reviewed:  History and physical reviewed and no updates needed  Statement of review:  I have reviewed the lab findings, diagnostic data, medications, and the plan for sedation     Manual Repair Warning Statement: We plan on removing the manually selected variable below in favor of our much easier automatic structured text blocks found in the previous tab. We decided to do this to help make the flow better and give you the full power of structured data. Manual selection is never going to be ideal in our platform and I would encourage you to avoid using manual selection from this point on, especially since I will be sunsetting this feature. It is important that you do one of two things with the customized text below. First, you can save all of the text in a word file so you can have it for future reference. Second, transfer the text to the appropriate area in the Library tab. Lastly, if there is a flap or graft type which we do not have you need to let us know right away so I can add it in before the variable is hidden. No need to panic, we plan to give you roughly 6 months to make the change.

## 2022-07-26 LAB
ANION GAP SERPL CALCULATED.3IONS-SCNC: 7 MMOL/L (ref 5–18)
BUN SERPL-MCNC: 15 MG/DL (ref 8–28)
CALCIUM SERPL-MCNC: 8.9 MG/DL (ref 8.5–10.5)
CHLORIDE BLD-SCNC: 104 MMOL/L (ref 98–107)
CO2 SERPL-SCNC: 24 MMOL/L (ref 22–31)
CREAT SERPL-MCNC: 0.88 MG/DL (ref 0.7–1.3)
GFR SERPL CREATININE-BSD FRML MDRD: >90 ML/MIN/1.73M2
GLUCOSE BLD-MCNC: 108 MG/DL (ref 70–125)
POTASSIUM BLD-SCNC: 4.7 MMOL/L (ref 3.5–5)
SODIUM SERPL-SCNC: 135 MMOL/L (ref 136–145)

## 2022-07-26 PROCEDURE — 250N000013 HC RX MED GY IP 250 OP 250 PS 637: Performed by: FAMILY MEDICINE

## 2022-07-26 PROCEDURE — 250N000011 HC RX IP 250 OP 636: Performed by: FAMILY MEDICINE

## 2022-07-26 PROCEDURE — 99233 SBSQ HOSP IP/OBS HIGH 50: CPT | Performed by: INTERNAL MEDICINE

## 2022-07-26 PROCEDURE — 250N000013 HC RX MED GY IP 250 OP 250 PS 637: Performed by: INTERNAL MEDICINE

## 2022-07-26 PROCEDURE — 36415 COLL VENOUS BLD VENIPUNCTURE: CPT | Performed by: FAMILY MEDICINE

## 2022-07-26 PROCEDURE — 80048 BASIC METABOLIC PNL TOTAL CA: CPT | Performed by: FAMILY MEDICINE

## 2022-07-26 PROCEDURE — 210N000001 HC R&B IMCU HEART CARE

## 2022-07-26 PROCEDURE — 99233 SBSQ HOSP IP/OBS HIGH 50: CPT | Performed by: FAMILY MEDICINE

## 2022-07-26 RX ADMIN — ONDANSETRON 4 MG: 4 TABLET, ORALLY DISINTEGRATING ORAL at 00:31

## 2022-07-26 RX ADMIN — ACETAMINOPHEN 650 MG: 325 TABLET ORAL at 08:52

## 2022-07-26 RX ADMIN — PANTOPRAZOLE SODIUM 40 MG: 40 TABLET, DELAYED RELEASE ORAL at 08:48

## 2022-07-26 RX ADMIN — ASPIRIN 81 MG CHEWABLE TABLET 81 MG: 81 TABLET CHEWABLE at 08:48

## 2022-07-26 RX ADMIN — ATORVASTATIN CALCIUM 40 MG: 40 TABLET, FILM COATED ORAL at 08:48

## 2022-07-26 ASSESSMENT — ACTIVITIES OF DAILY LIVING (ADL)
ADLS_ACUITY_SCORE: 38
ADLS_ACUITY_SCORE: 20
ADLS_ACUITY_SCORE: 38
ADLS_ACUITY_SCORE: 38
ADLS_ACUITY_SCORE: 20

## 2022-07-26 NOTE — PLAN OF CARE
Assumed care from approximately 1609 to 0730. A&O x 4. Independent. Tele is NSR. Denies pain. R. Radial access site; clean, dry, & intact. No hematoma or bleeding. Up to toilet, urinal at bedside. Zofran given for nausea (see MAR). NPO @ 0000. Call light within reach, able to make needs known. Bed alarm on for safety.    Problem: Chest Pain  Goal: Resolution of Chest Pain Symptoms  Outcome: Ongoing, Progressing     Problem: Risk for Delirium  Goal: Improved Sleep  Outcome: Ongoing, Progressing

## 2022-07-26 NOTE — PROGRESS NOTES
"Assessment & Plan   72-year-old male with a history of hypertension, GERD presented with chest pain found to have NSTEMI.    Active Problems:    NSTEMI (non-ST elevated myocardial infarction) (H)    CAD (coronary artery disease)    Sinus bradycardia    Benign essential hypertension    Gastroesophageal reflux disease without esophagitis    Hyponatremia    Present on Admission:    NSTEMI (non-ST elevated myocardial infarction) (H)      NSTEMI, RCA stenosis   -Patient presented with 2 days of intermittent chest pain.  He EKG found to have bradycardia and RBBB but patient with elevated troponin 1.3 --> 3.77 --> 6.24 -> 7.53.  Patient initiated on heparin infusion as well as Nitropaste.  Cardiac catheterization with 100% stenosis of mid RCA.  TTE 7/25/2022 with mild inferolateral wall hypokinesis of the LV, ejection fraction of 57%, no hemodynamically significant valvular abnormalities.  Recommending cardiac MRI to further evaluate tissue viability and if PCI would be warranted.  -Cardiac telemetry  -ASA 81 mg p.o. daily  -Atorvastatin 40 mg p.o. daily  -Isosorbide mononitrate on hold for hypotension  -Holding beta blocker as causes significant bradycardia  -Cardiac MRI pending  -Cardiology consulted, appreciate recommendation    Sinus bradycardia  -Seen on EKG.  Will avoid beta-blockers at this time.    HTN  -Lisinopril 10 mg p.o. daily, hold parameters    GERD  -Pantoprazole 40 mg p.o. daily    Hyponatremia  -Mild, likely hypovolemic hyponatremia.  TSH and cortisol both normal.  Improving spontaneously with oral intake.     Clinically Significant Risk Factors Present on Admission                # Overweight: Estimated body mass index is 26.56 kg/m  as calculated from the following:    Height as of this encounter: 1.93 m (6' 4\").    Weight as of this encounter: 99 kg (218 lb 3.2 oz).        Electrolytes: sodium 135  Fluids: po  Diet: cardiac   VTE prophylaxis: SCD boots    COVID19 symptom check " 7/26/2022  Fevers/Chills/myalgias: NEGATIVE TO ALL  Sick contacts/COVID19 exposure: NEGATIVE TO ALL  Cough/trouble breathing/SOB/sore throat: NEGATIVE TO ALL  Diarrhea: NEGATIVE       Expected Discharge Date: 07/27/2022        Discharge Comments: Cardiac MRI 7/26.        Subjective  Cc: chest pain    Patient endorses that he became dizzy last night after using the restroom and felt like he might vomit.  After he laid down in bed he felt better and has had no further incidence of dizziness.  No chest pain, no shortness of breath.    Review of Systems: History obtained from the patient  General ROS: negative for  - chills, fatigue, fever  ENT ROS: negative for - headaches, hearing change, nasal congestion, nasal discharge  Hematological and Lymphatic ROS: negative for - bleeding problems, blood clots, bruising  Respiratory ROS: negative for - cough, pleuritic pain, shortness of breath  Cardiovascular ROS: negative for -mild chest pain, dyspnea on exertion, edema  Gastrointestinal ROS: negative for - abdominal pain, constipation, diarrhea, and nausea/vomiting  Genito-Urinary ROS: negative for -  dysuria, hematuria  Musculoskeletal ROS: negative for - gait disturbance, muscle pain  Neurological ROS: negative for - behavioral changes, confusion, dizziness, numbness/tingling   Dermatological ROS: negative for pruritus, rash    Objective    Physical Examination:   General appearance - alert, well appearing, and in no distress and oriented to person, place, and time  Mental status - alert, oriented to person, place, and time, normal mood, behavior, speech, dress, motor activity, and thought processes  HEENT - sclera anicteric, left eye normal, right eye normal, nares normal and patent, no erythema  Respiratory - clear to auscultation, no wheezes, crackles, tachypnea  Cardiac - normal rate, regular rhythm, no murmurs  Abdomen - soft, nontender, nondistended, no masses or organomegaly no rebound tenderness noted bowel sounds  normal, no bladder distension noted  Neurological - alert, oriented, normal speech, no focal findings or movement disorder noted  Musculoskeletal - no joint tenderness, deformity or swelling, full range of motion without pain  Extremities - peripheral pulses normal, no pedal edema, no clubbing or cyanosis  Skin - normal coloration and turgor, no rashes, no suspicious skin lesions noted    Temp:  [98  F (36.7  C)-98.7  F (37.1  C)] 98.7  F (37.1  C)  Pulse:  [56-80] 56  Resp:  [12-20] 18  BP: ()/(53-84) 88/55  SpO2:  [92 %-100 %] 92 %      Intake/Output Summary (Last 24 hours) at 7/25/2022 0708  Last data filed at 7/25/2022 0019  Gross per 24 hour   Intake 3 ml   Output 275 ml   Net -272 ml       Results    Recent Results (from the past 24 hour(s))   Echocardiogram Complete    Collection Time: 07/25/22  2:26 PM   Result Value Ref Range    Biplane LVEF 57%    Basic metabolic panel    Collection Time: 07/26/22  4:30 AM   Result Value Ref Range    Sodium 135 (L) 136 - 145 mmol/L    Potassium 4.7 3.5 - 5.0 mmol/L    Chloride 104 98 - 107 mmol/L    Carbon Dioxide (CO2) 24 22 - 31 mmol/L    Anion Gap 7 5 - 18 mmol/L    Urea Nitrogen 15 8 - 28 mg/dL    Creatinine 0.88 0.70 - 1.30 mg/dL    Calcium 8.9 8.5 - 10.5 mg/dL    Glucose 108 70 - 125 mg/dL    GFR Estimate >90 >60 mL/min/1.73m2      Conclusion      Left ventricular filling pressures are normal.    Prox LAD to Mid LAD lesion is 20% stenosed.    Prox RCA to Mid RCA lesion is 100% stenosed.    Global LV function mild reduction with akintis inferobase        Lab Results personally reviewed 7/26  Imaging Results personally reviewed 7/26  Discussed with patient and his wife  Reviewed old records     Advanced Care Planning  Discharge Planning discussed with patient and family  Discussed care with patient and family for 35 minutes with greater than 50% of time spent in counseling and coordination of care.    Updated patient's wife Beata at bedside on 7/26  Sharon DIAZ  DO [place]  Hospitalist Service  [facility]  Text page via [facility] Paging/Directory     This note was created using dragon dictation, any spelling and grammatical errors are unintentional.

## 2022-07-26 NOTE — PROGRESS NOTES
"  HEART CARE NOTE          Assessment/Recommendations     1. Chest pain c/b NSTEMI  Assessment / Plan    S/p coronary angiogram significant for mild mid LAD disease and 100% RCA; will get MRI to assess viability     Continue ASA, atorvastatin, lisinopril; BBlocker on hold 2/2 bradycardia    Continue heparin and nitroglycerin gtt     2. HTN  Assessment / Plan    Adequately controlled on current regimen - no changes at this time       History of Present Illness/Subjective      History of Present Illness/Subjective    Mr. Dilip Frye is a 72 year old male with a PMHx significant for HTN who presents with NSTEMI.     Today, Mr. Frye denies any current chest pain, palpitations, lightheadedness/dizziness, dyspnea; Management plan as detailed above.        ECG: Personally reviewed. sinus bradycardia, occasional PVC noted, unifocal.     ECHO:    1. Left ventricular size and wall thickness are normal. There is mild  inferolateral wall hypokinesis although overall systolic function is normal.  The calculated left ventricular ejection fraction is 57%.  2. Right ventricular size and systolic function are normal.  3. No hemodynamically significant valvular abnormalities.  4. No prior study available for comparison.            Physical Examination Review of Systems   BP (!) 88/55 (BP Location: Left arm)   Pulse 56   Temp 98.7  F (37.1  C) (Oral)   Resp 18   Ht 1.93 m (6' 4\")   Wt 99 kg (218 lb 3.2 oz)   SpO2 92%   BMI 26.56 kg/m    Body mass index is 26.56 kg/m .  Wt Readings from Last 3 Encounters:   07/26/22 99 kg (218 lb 3.2 oz)   12/10/14 107.5 kg (237 lb)   12/16/14 107 kg (236 lb)     General Appearance:   no distress, normal body habitus   ENT/Mouth: membranes moist, no oral lesions or bleeding gums.      EYES:  no scleral icterus, normal conjunctivae   Neck: no carotid bruits or thyromegaly   Chest/Lungs:   lungs are clear to auscultation, no rales or wheezing, equal chest wall expansion    Cardiovascular:   " Regular. Normal first and second heart sounds with no murmurs, rubs, or gallops; the carotid, radial and posterior tibial pulses are intact, no JVD or LE edema bilaterally    Abdomen:  no organomegaly, masses, bruits, or tenderness; bowel sounds are present   Extremities: no cyanosis or clubbing   Skin: no xanthelasma, warm.    Neurologic: alert and oriented x3, calm     Psychiatric: alert and oriented x3, calm     A complete 10 systems ROS was reviewed  And is negative except what is listed in the HPI.          Medical History  Surgical History Family History Social History   Past Medical History:   Diagnosis Date     Benign essential hypertension      Gastroesophageal reflux disease with esophagitis     Past Surgical History:   Procedure Laterality Date     CERVICAL LAMINECTOMY      no family history of premature coronary artery disease Social History     Socioeconomic History     Marital status:      Spouse name: Not on file     Number of children: Not on file     Years of education: Not on file     Highest education level: Not on file   Occupational History     Not on file   Tobacco Use     Smoking status: Never Smoker     Smokeless tobacco: Never Used   Substance and Sexual Activity     Alcohol use: Yes     Alcohol/week: 1.0 standard drink     Comment: Alcoholic Drinks/day: One drink twice a month per pt     Drug use: No     Sexual activity: Not on file   Other Topics Concern     Not on file   Social History Narrative    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     Social Determinants of Health     Financial Resource Strain: Not on file   Food Insecurity: Not on file   Transportation Needs: Not on file   Physical Activity: Not on file   Stress: Not on file   Social Connections: Not on file   Intimate Partner Violence: Not on file   Housing Stability: Not on file           Lab Results    Chemistry/lipid CBC Cardiac Enzymes/BNP/TSH/INR   Lab Results   Component Value Date    CHOL 184  07/25/2022    HDL 31 (L) 07/25/2022    TRIG 171 (H) 07/25/2022    BUN 15 07/26/2022     (L) 07/26/2022    CO2 24 07/26/2022    Lab Results   Component Value Date    WBC 6.7 07/24/2022    HGB 15.1 07/24/2022    HCT 44.1 07/24/2022    MCV 89 07/24/2022     07/24/2022    Lab Results   Component Value Date    TROPONINI 7.53 (HH) 07/25/2022    BNP 24 07/24/2022    TSH 2.04 07/25/2022    INR 1.01 07/24/2022     Lab Results   Component Value Date    TROPONINI 7.53 (HH) 07/25/2022          Weight:    Wt Readings from Last 3 Encounters:   07/26/22 99 kg (218 lb 3.2 oz)   12/10/14 107.5 kg (237 lb)   12/16/14 107 kg (236 lb)       Allergies  No Known Allergies      Surgical History  Past Surgical History:   Procedure Laterality Date     CERVICAL LAMINECTOMY         Social History  Tobacco:   History   Smoking Status     Never Smoker   Smokeless Tobacco     Never Used    Alcohol:   Social History    Substance and Sexual Activity      Alcohol use: Yes        Alcohol/week: 1.0 standard drink        Comment: Alcoholic Drinks/day: One drink twice a month per pt   Illicit Drugs:   History   Drug Use No       Family History  Family History   Problem Relation Age of Onset     Brain Cancer Mother      No Known Problems Father      Coronary Artery Disease Early Onset No family hx of           Syed Antony MD on 7/26/2022      cc: Roshan Sandoval     complains of pain/discomfort

## 2022-07-27 ENCOUNTER — APPOINTMENT (OUTPATIENT)
Dept: MRI IMAGING | Facility: HOSPITAL | Age: 72
DRG: 281 | End: 2022-07-27
Attending: INTERNAL MEDICINE
Payer: COMMERCIAL

## 2022-07-27 VITALS
HEIGHT: 76 IN | BODY MASS INDEX: 26.52 KG/M2 | TEMPERATURE: 97.6 F | OXYGEN SATURATION: 98 % | DIASTOLIC BLOOD PRESSURE: 64 MMHG | RESPIRATION RATE: 20 BRPM | SYSTOLIC BLOOD PRESSURE: 109 MMHG | HEART RATE: 68 BPM | WEIGHT: 217.8 LBS

## 2022-07-27 LAB
ERYTHROCYTE [DISTWIDTH] IN BLOOD BY AUTOMATED COUNT: 12.4 % (ref 10–15)
HCT VFR BLD AUTO: 37.3 % (ref 40–53)
HGB BLD-MCNC: 12.9 G/DL (ref 13.3–17.7)
HOLD SPECIMEN: NORMAL
MCH RBC QN AUTO: 30.8 PG (ref 26.5–33)
MCHC RBC AUTO-ENTMCNC: 34.6 G/DL (ref 31.5–36.5)
MCV RBC AUTO: 89 FL (ref 78–100)
PLATELET # BLD AUTO: 168 10E3/UL (ref 150–450)
RBC # BLD AUTO: 4.19 10E6/UL (ref 4.4–5.9)
WBC # BLD AUTO: 5.8 10E3/UL (ref 4–11)

## 2022-07-27 PROCEDURE — 85027 COMPLETE CBC AUTOMATED: CPT | Performed by: FAMILY MEDICINE

## 2022-07-27 PROCEDURE — 255N000002 HC RX 255 OP 636: Performed by: INTERNAL MEDICINE

## 2022-07-27 PROCEDURE — 99233 SBSQ HOSP IP/OBS HIGH 50: CPT | Mod: 25 | Performed by: INTERNAL MEDICINE

## 2022-07-27 PROCEDURE — 36415 COLL VENOUS BLD VENIPUNCTURE: CPT | Performed by: FAMILY MEDICINE

## 2022-07-27 PROCEDURE — 250N000013 HC RX MED GY IP 250 OP 250 PS 637: Performed by: FAMILY MEDICINE

## 2022-07-27 PROCEDURE — 999N000122 MR MYOCARDIUM  OVERREAD

## 2022-07-27 PROCEDURE — A9585 GADOBUTROL INJECTION: HCPCS | Performed by: INTERNAL MEDICINE

## 2022-07-27 PROCEDURE — 75561 CARDIAC MRI FOR MORPH W/DYE: CPT

## 2022-07-27 PROCEDURE — 99239 HOSP IP/OBS DSCHRG MGMT >30: CPT | Performed by: INTERNAL MEDICINE

## 2022-07-27 PROCEDURE — 75561 CARDIAC MRI FOR MORPH W/DYE: CPT | Mod: 26 | Performed by: GENERAL ACUTE CARE HOSPITAL

## 2022-07-27 RX ORDER — GADOBUTROL 604.72 MG/ML
17 INJECTION INTRAVENOUS ONCE
Status: COMPLETED | OUTPATIENT
Start: 2022-07-27 | End: 2022-07-27

## 2022-07-27 RX ORDER — ASPIRIN 81 MG/1
81 TABLET, CHEWABLE ORAL DAILY
Qty: 30 TABLET | Refills: 0 | Status: ON HOLD | OUTPATIENT
Start: 2022-07-28 | End: 2022-08-24

## 2022-07-27 RX ORDER — ATORVASTATIN CALCIUM 40 MG/1
40 TABLET, FILM COATED ORAL DAILY
Qty: 30 TABLET | Refills: 0 | Status: ON HOLD | OUTPATIENT
Start: 2022-07-28 | End: 2022-08-24

## 2022-07-27 RX ORDER — METOPROLOL SUCCINATE 25 MG/1
25 TABLET, EXTENDED RELEASE ORAL DAILY
Qty: 30 TABLET | Refills: 0 | Status: SHIPPED | OUTPATIENT
Start: 2022-07-27 | End: 2022-08-26

## 2022-07-27 RX ORDER — ISOSORBIDE MONONITRATE 30 MG/1
30 TABLET, EXTENDED RELEASE ORAL DAILY
Qty: 30 TABLET | Refills: 0 | Status: SHIPPED | OUTPATIENT
Start: 2022-07-27 | End: 2022-08-24

## 2022-07-27 RX ADMIN — ASPIRIN 81 MG CHEWABLE TABLET 81 MG: 81 TABLET CHEWABLE at 07:42

## 2022-07-27 RX ADMIN — PANTOPRAZOLE SODIUM 40 MG: 40 TABLET, DELAYED RELEASE ORAL at 07:42

## 2022-07-27 RX ADMIN — GADOBUTROL 17 ML: 604.72 INJECTION INTRAVENOUS at 06:23

## 2022-07-27 RX ADMIN — ATORVASTATIN CALCIUM 40 MG: 40 TABLET, FILM COATED ORAL at 07:42

## 2022-07-27 RX ADMIN — LISINOPRIL 10 MG: 5 TABLET ORAL at 08:07

## 2022-07-27 ASSESSMENT — ACTIVITIES OF DAILY LIVING (ADL)
ADLS_ACUITY_SCORE: 20

## 2022-07-27 NOTE — PLAN OF CARE
Problem: Plan of Care - These are the overarching goals to be used throughout the patient stay.    Goal: Plan of Care Review/Shift Note  Description: The Plan of Care Review/Shift note should be completed every shift.  The Outcome Evaluation is a brief statement about your assessment that the patient is improving, declining, or no change.  This information will be displayed automatically on your shift note.  Outcome: Ongoing, Progressing  Flowsheets (Taken 7/26/2022 2158)  Plan of Care Reviewed With:   patient   spouse     Problem: Risk for Delirium  Goal: Optimal Coping  Outcome: Ongoing, Progressing     Problem: Chest Pain  Goal: Resolution of Chest Pain Symptoms  Outcome: Ongoing, Progressing     Problem: Arrhythmia/Dysrhythmia (Cardiac Catheterization)  Goal: Stable Heart Rate and Rhythm  Outcome: Ongoing, Progressing     Problem: Bleeding (Cardiac Catheterization)  Goal: Absence of Bleeding  Outcome: Ongoing, Progressing   Goal Outcome Evaluation:    Plan of Care Reviewed With: patient, spouse   Dilip had a good shift. Family here to visit. No complaints of pain. Angio site is clean, dry and intact. Has remained in NSR this shift. MRI checklist done for cardiac MRI in the am.

## 2022-07-27 NOTE — PLAN OF CARE
Problem: Chest Pain  Goal: Resolution of Chest Pain Symptoms  Outcome: Adequate for Care Transition     Problem: Arrhythmia/Dysrhythmia (Cardiac Catheterization)  Goal: Stable Heart Rate and Rhythm  Outcome: Adequate for Care Transition   Goal Outcome Evaluation:           PT was discharged to home with wife.  PT denies pain, sob, nausea.  Writer explained poc and pt states understanding.  Writer answered all pt questions.   PT was given medications from [facility] and wife will  additional med [facility].  PT states he will make appointment with pcp.     Pt wife was able to talk with cardiologist before discharge and understands they will receive further call with plan of care.

## 2022-07-27 NOTE — PROGRESS NOTES
"  HEART CARE NOTE          Assessment/Recommendations     1. Chest pain c/b NSTEMI  Assessment / Plan    S/p coronary angiogram significant for mild mid LAD disease and 100% RCA; S/P MRI to assess viability - awaiting recs    Continue ASA, atorvastatin, lisinopril; BBlocker on hold 2/2 bradycardia     2. HTN  Assessment / Plan    Adequately controlled on current regimen - no changes at this time      History of Present Illness/Subjective      Mr. Dilip Frye is a 72 year old male with a PMHx significant for HTN who presents with NSTEMI.     Today, Mr. Frye denies any current chest pain, palpitations, lightheadedness/dizziness, dyspnea; Management plan as detailed above.        ECG: Personally reviewed. sinus bradycardia, occasional PVC noted, unifocal.     ECHO:    1. Left ventricular size and wall thickness are normal. There is mild  inferolateral wall hypokinesis although overall systolic function is normal.  The calculated left ventricular ejection fraction is 57%.  2. Right ventricular size and systolic function are normal.  3. No hemodynamically significant valvular abnormalities.  4. No prior study available for comparison.            Physical Examination Review of Systems   BP 99/62 (BP Location: Left arm)   Pulse 59   Temp 98  F (36.7  C) (Oral)   Resp 20   Ht 1.93 m (6' 4\")   Wt 99 kg (218 lb 3.2 oz)   SpO2 95%   BMI 26.56 kg/m    Body mass index is 26.56 kg/m .  Wt Readings from Last 3 Encounters:   07/26/22 99 kg (218 lb 3.2 oz)   12/10/14 107.5 kg (237 lb)   12/16/14 107 kg (236 lb)     General Appearance:   no distress, normal body habitus   ENT/Mouth: membranes moist, no oral lesions or bleeding gums.      EYES:  no scleral icterus, normal conjunctivae   Neck: no carotid bruits or thyromegaly   Chest/Lungs:   lungs are clear to auscultation, no rales or wheezing, equal chest wall expansion    Cardiovascular:   Regular. Normal first and second heart sounds with no murmurs, rubs, or gallops; " the carotid, radial and posterior tibial pulses are intact, no JVD or LE edema bilaterally    Abdomen:  no organomegaly, masses, bruits, or tenderness; bowel sounds are present   Extremities: no cyanosis or clubbing   Skin: no xanthelasma, warm.    Neurologic: alert and oriented x3, calm     Psychiatric: alert and oriented x3, calm     A complete 10 systems ROS was reviewed  And is negative except what is listed in the HPI.          Medical History  Surgical History Family History Social History   Past Medical History:   Diagnosis Date     Benign essential hypertension      Gastroesophageal reflux disease with esophagitis     Past Surgical History:   Procedure Laterality Date     CERVICAL LAMINECTOMY      no family history of premature coronary artery disease Social History     Socioeconomic History     Marital status:      Spouse name: Not on file     Number of children: Not on file     Years of education: Not on file     Highest education level: Not on file   Occupational History     Not on file   Tobacco Use     Smoking status: Never Smoker     Smokeless tobacco: Never Used   Substance and Sexual Activity     Alcohol use: Yes     Alcohol/week: 1.0 standard drink     Comment: Alcoholic Drinks/day: One drink twice a month per pt     Drug use: No     Sexual activity: Not on file   Other Topics Concern     Not on file   Social History Narrative    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     Social Determinants of Health     Financial Resource Strain: Not on file   Food Insecurity: Not on file   Transportation Needs: Not on file   Physical Activity: Not on file   Stress: Not on file   Social Connections: Not on file   Intimate Partner Violence: Not on file   Housing Stability: Not on file           Lab Results    Chemistry/lipid CBC Cardiac Enzymes/BNP/TSH/INR   Lab Results   Component Value Date    CHOL 184 07/25/2022    HDL 31 (L) 07/25/2022    TRIG 171 (H) 07/25/2022    BUN 15 07/26/2022      (L) 07/26/2022    CO2 24 07/26/2022    Lab Results   Component Value Date    WBC 6.7 07/24/2022    HGB 15.1 07/24/2022    HCT 44.1 07/24/2022    MCV 89 07/24/2022     07/24/2022    Lab Results   Component Value Date    TROPONINI 7.53 (HH) 07/25/2022    BNP 24 07/24/2022    TSH 2.04 07/25/2022    INR 1.01 07/24/2022     Lab Results   Component Value Date    TROPONINI 7.53 (HH) 07/25/2022          Weight:    Wt Readings from Last 3 Encounters:   07/26/22 99 kg (218 lb 3.2 oz)   12/10/14 107.5 kg (237 lb)   12/16/14 107 kg (236 lb)       Allergies  No Known Allergies      Surgical History  Past Surgical History:   Procedure Laterality Date     CERVICAL LAMINECTOMY         Social History  Tobacco:   History   Smoking Status     Never Smoker   Smokeless Tobacco     Never Used    Alcohol:   Social History    Substance and Sexual Activity      Alcohol use: Yes        Alcohol/week: 1.0 standard drink        Comment: Alcoholic Drinks/day: One drink twice a month per pt   Illicit Drugs:   History   Drug Use No       Family History  Family History   Problem Relation Age of Onset     Brain Cancer Mother      No Known Problems Father      Coronary Artery Disease Early Onset No family hx of           Syed Antony MD on 7/27/2022      cc: Roshan Sandoval

## 2022-07-27 NOTE — DISCHARGE SUMMARY
[facility]  Hospitalist Discharge Summary      Date of Admission:  7/24/2022  Date of Discharge:  7/27/2022  Discharging Provider: Destiny Buckley MD  Discharge Service: Hospitalist Service    Discharge Diagnoses   Active Problems:    NSTEMI (non-ST elevated myocardial infarction) (H)    CAD (coronary artery disease)    Sinus bradycardia    Benign essential hypertension    Gastroesophageal reflux disease without esophagitis    Hyponatremia      Follow-ups Needed After Discharge   Follow-up Appointments     Follow-up and recommended labs and tests       Cardiology to have conference on 7/28 to discuss management and will   contact him to return for elective treatment/intervention if needed.     Follow up with primary care provider, Roshan Sandoval, within 7 days,   to evaluate medication change and for hospital follow- up.             Unresulted Labs Ordered in the Past 30 Days of this Admission     No orders found from 6/24/2022 to 7/25/2022.          Discharge Disposition   Discharged to home  Condition at discharge: Stable      Hospital Course   72YOM with no previous history of coronary artery disease admitted to the hospital with 2 days of chest pain, found to have NSTEMI     NSTEMI  --- No acute changes on EKG.  --- Begin heparin drip now off  - cards consulted   - coronary angiogram significant for mild mid LAD disease and 100% RCA;   - cardiac MRI to assess viability  - discussed with cardiology and plan is for his case to be discussed and Cath conference tomorrow and determine best management. Patient can discharge and cardiology will contact after discussion for further course of action. Reviewed with patient and wife, Cardiology also called them and updated.   - Continue ASA, atorvastatin, lisinopril; Imdur and prn Ntg  - low dose BB per cards   - return if recurrent CP      Bradycardia  --- Not on home beta-blocker   - low dose metoprolol per cardiology       Hypertension  --- Blood pressures appear controlled   - BP meds as above      Hyponatremia--mild.  Continue to monitor    Consultations This Hospital Stay   PHARMACY IP CONSULT  PHARMACY IP CONSULT  CARDIAC REHAB IP CONSULT  CARDIOLOGY IP CONSULT  PHARMACY IP CONSULT  PHARMACY IP CONSULT  SMOKING CESSATION PROGRAM IP CONSULT    Code Status   Full Code    Time Spent on this Encounter   I, Destiny Buckley MD, personally saw the patient today and spent greater than 30 minutes discharging this patient.       Destiny Buckley MD  [facility]  [address] [place], [place] [address]  Phone: [phone]  Fax: [phone]  ______________________________________________________________________    Physical Exam   Vital Signs: Temp: 97.6  F (36.4  C) Temp src: Oral BP: 109/64 Pulse: 68   Resp: 20 SpO2: 98 % O2 Device: None (Room air)    Weight: 217 lbs 12.8 oz  Physical Exam  Constitutional:       Appearance: Normal appearance.   HENT:      Head: Normocephalic and atraumatic.   Cardiovascular:      Rate and Rhythm: Normal rate and regular rhythm.      Pulses: Normal pulses.      Heart sounds: Normal heart sounds.   Pulmonary:      Effort: Pulmonary effort is normal.      Breath sounds: Normal breath sounds.   Abdominal:      General: Bowel sounds are normal.      Palpations: Abdomen is soft.   Musculoskeletal:         General: Normal range of motion.      Right lower leg: No edema.      Left lower leg: No edema.   Skin:     General: Skin is warm and dry.      Capillary Refill: Capillary refill takes less than 2 seconds.   Neurological:      General: No focal deficit present.      Mental Status: He is alert and oriented to person, place, and time. Mental status is at baseline.              Primary Care Physician   Roshan Sandoval    Discharge Orders      Ambulatory CARDIAC REHAB REFERRAL      Reason for your hospital stay    Active Problems:    NSTEMI (non-ST elevated  myocardial infarction) (H)    CAD (coronary artery disease)    Sinus bradycardia    Benign essential hypertension    Gastroesophageal reflux disease without esophagitis    Hyponatremia     Follow-up and recommended labs and tests     Cardiology to have conference on 7/28 to discuss management and will contact him to return for elective treatment/intervention if needed.     Follow up with primary care provider, Roshan Sandoval, within 7 days, to evaluate medication change and for hospital follow- up.     Activity    Your activity upon discharge: activity as tolerated, but refrain from strenuous activity until cardiology clears     Diet    Follow this diet upon discharge: Orders Placed This Encounter      Low Saturated Fat Na <2400 mg       Significant Results and Procedures   Most Recent 3 CBC's:Recent Labs   Lab Test 07/27/22  0450 07/24/22  1657   WBC 5.8 6.7   HGB 12.9* 15.1   MCV 89 89    218     Most Recent 3 BMP's:Recent Labs   Lab Test 07/26/22  0430 07/25/22  0356 07/24/22  2144   * 133* 133*   POTASSIUM 4.7 4.2 4.3   CHLORIDE 104 101 100   CO2 24 24 26   BUN 15 13 13   CR 0.88 0.79 0.84   ANIONGAP 7 8 7   TRAE 8.9 9.0 9.3    105 105     Most Recent 2 LFT's:Recent Labs   Lab Test 07/24/22  2144   AST 42*   ALT 26   ALKPHOS 58   BILITOTAL 0.6     Most Recent Cholesterol Panel:Recent Labs   Lab Test 07/25/22  0356   CHOL 184      HDL 31*   TRIG 171*   ,   Results for orders placed or performed during the hospital encounter of 07/24/22   Chest XR,  PA & LAT    Narrative    EXAM: XR CHEST 2 VIEWS  LOCATION: [facility]  DATE/TIME: 7/24/2022 5:34 PM    INDICATION: chest pain  COMPARISON: None.      Impression    IMPRESSION: Negative chest.   MR Myocardium  Overread    Narrative    OVERREAD: DETAILED [place] [facility] EXTRACARDIAC OVERREAD OF CARDIAC MRI   LOCATION: [facility]  DATE/TIME: 7/27/2022 5:44 AM    INDICATION: Evaluate  viability RCA occlusion on coronary angiogram  COMPARISON: CT of the abdomen and pelvis which includes the basal chest 2014.    FINDINGS:    LIMITED CHEST: 4 mm pulmonary nodule in the right lateral costophrenic sulcus (series 3.2, image 5) is stable from , definitively benign.  LIMITED MEDIASTINUM: Small hiatal hernia.  LIMITED UPPER ABDOMEN: Negative.      Impression    IMPRESSION:  1. No actionable incidental extracardiac findings.  2. Please refer to cardiologist's dictation for the cardiac MRI report.   Echocardiogram Complete     Value    Biplane LVEF 57%    Narrative    [id number]  [id number]  [id number]  [id number]^[address] [address]     Name: CHRIS BLANK  MRN: [id number]  : 1950  Study Date: 2022 01:56 PM  Age: 72 yrs  Gender: Male  Patient Location: [facility]  Reason For Study: MI - Acute  Ordering Physician: BRANDON URBAN  Referring Physician: BRANDON URBAN  Performed By:      BSA: 2.3 m2  Height: 76 in  Weight: 221 lb  HR: 65  ______________________________________________________________________________  Procedure  Complete Echo Adult.  ______________________________________________________________________________  Interpretation Summary     1. Left ventricular size and wall thickness are normal. There is mild  inferolateral wall hypokinesis although overall systolic function is normal.  The calculated left ventricular ejection fraction is 57%.  2. Right ventricular size and systolic function are normal.  3. No hemodynamically significant valvular abnormalities.  4. No prior study available for comparison.  ______________________________________________________________________________  I      WMSI = 1.13     % Normal = 88     X - Cannot   0 -                      (2) - Mildly 2 -          Segments  Size  Interpret    Hyperkinetic 1 - Normal  Hypokinetic  Hypokinetic  1-2     small                                                      7 -          3-5      moderate  3 - Akinetic 4 -          5 -         6 - Akinetic Dyskinetic   6-14    large               Dyskinetic   Aneurysmal  w/scar       w/scar       15-16   diffuse     Left Ventricle  The left ventricle is normal in size. There is normal left ventricular wall  thickness. Left ventricular systolic function is normal. Biplane LVEF is 57%.  Left ventricular diastolic function is normal. Diastolic Doppler findings  (E/E' ratio and/or other parameters) suggest left ventricular filling  pressures are normal. There is mild inferolateral wall hypokinesis.     Right Ventricle  Normal right ventricle size and systolic function.     Atria  Normal left atrial size. Right atrial size is normal.     Mitral Valve  Mitral valve leaflets appear normal. There is trace mitral regurgitation.  There is no mitral valve stenosis.     Tricuspid Valve  Tricuspid valve leaflets appear normal. There is trace tricuspid  regurgitation. Right ventricular systolic pressure could not be approximated  due to inadequate tricuspid regurgitation. There is no tricuspid stenosis.     Aortic Valve  The aortic valve is trileaflet with aortic valve sclerosis. There is trace  aortic regurgitation. No aortic stenosis is present.     Pulmonic Valve  The pulmonic valve is not well visualized. There is trace pulmonic valvular  regurgitation. There is no pulmonic valvular stenosis.     Vessels  The aorta root is normal. The thoracic aorta is normal. IVC diameter <2.1 cm  collapsing >50% with sniff suggests a normal RA pressure of 3 mmHg.     Pericardium  There is no pericardial effusion.     Rhythm  Sinus rhythm was noted.     ______________________________________________________________________________  MMode/2D Measurements & Calculations  IVSd: 0.89 cm  LVIDd: 5.2 cm  LVIDs: 4.0 cm  LVPWd: 0.88 cm  FS: 23.1 %     LV mass(C)d: 165.8 grams  LV mass(C)dI: 71.7 grams/m2  Ao root diam: 3.3 cm  LA dimension: 4.3  cm  asc Aorta Diam: 3.3 cm  LA/Ao: 1.3  LVOT diam: 2.1 cm  LVOT area: 3.3 cm2  LA Volume Indexed (AL/bp): 22.9 ml/m2  RWT: 0.34     Time Measurements  MM HR: 54.0 BPM     Doppler Measurements & Calculations  MV E max glenn: 65.6 cm/sec  MV A max glenn: 76.7 cm/sec  MV E/A: 0.86  MV max P.9 mmHg  MV mean P.3 mmHg  MV V2 VTI: 30.2 cm  MVA(VTI): 2.2 cm2  MV dec slope: 377.2 cm/sec2  MV dec time: 0.18 sec  Ao V2 max: 122.5 cm/sec  Ao max P.0 mmHg  Ao V2 mean: 93.5 cm/sec  Ao mean PG: 3.8 mmHg  Ao V2 VTI: 22.4 cm  TOMEKA(I,D): 3.0 cm2  TOMEKA(V,D): 2.6 cm2  LV V1 max PG: 3.7 mmHg  LV V1 max: 96.0 cm/sec  LV V1 VTI: 20.4 cm  SV(LVOT): 67.9 ml  SI(LVOT): 29.4 ml/m2  PA acc time: 0.13 sec  AV Glenn Ratio (DI): 0.78  TOMEKA Index (cm2/m2): 1.3  E/E' av.1  Lateral E/e': 6.7  Medial E/e': 11.4     ______________________________________________________________________________  Report approved by: Macrina Farr 2022 03:11 PM         Cardiac Catheterization    Narrative      Left ventricular filling pressures are normal.    Prox LAD to Mid LAD lesion is 20% stenosed.    Prox RCA to Mid RCA lesion is 100% stenosed.    Global LV function mild reduction with akintis inferobase      MR Myocardium w Contrast    Narrative                                                     [facility]                                                     CMR Report      MRN:                  [id number]                                  Name:           CHRIS BLANK                                  :                  1950                                  Scan Date:   2022 05:59:27                                  Electronically signed by Usama Osman  12:14:48    VITALS   ==========================================================================================================    HEIGHT: 75.98 in    (193.00 cm)  WEIGHT: 218.19 lbs    (98.97 kgs)  BSA: 2.30 m^2    SUMMARY    ==========================================================================================================    1.  Left ventricular size is at the upper limits of normal. Wall thickness is normal. Systolic function is  mildly reduced with inferior and inferolateral wall hypokinesis. The quantified left ventricular ejection  fraction is 49%.   2.  Moderate-sized area of myocardial edema and decreased resting perfusion in the [id number] inferior  and inferolateral wall consistent with acute-to-subacute myocardial infarction in the territory of the  right coronary artery.  3.  Minimal subendocardial scar extending to 1-25% the thickness of the basal inferior and inferolateral  walls suggesting a high probability of viability in the the territory of the right coronary artery.   4.  Right ventricular size is mildly enlarged. Systolic function is mildly reduced.  The quantified right  ventricular ejection fraction is 45%.   5.  Mild left atrial enlargement.  6.  No significant valvular abnormalities.    ==========================================================================================================  REPORT FINDINGS:    LEFT VENTRICLE: LV wall thickness is normal. LV cavity size is at the upper limits of normal. The myocardial T1 value in  the basal inferior wall is markedly elevated at 1180 ms suggestive of myocardial edema. The native  (pre-contrast) myocardial T1 value (assessed remote from the area of edema) measures 1013 ms and the  calculated ECV is 23% using a hematocrit of 37. There is a medium-sized resting subendocardial perfusion  defect in the [id number] inferior wall. There is no LV mass/thrombus. LV systolic function is mildly  reduced with inferior and inferolateral wall hypokinesis. Quantitative LVEF 49 %.    VIABILITY: LV scar size is 2 %.    RIGHT VENTRICLE: RV wall thickness is normal. RV cavity size is mildly enlarged. RV systolic function is mildly reduced.  There is no RV mass/thrombus.  Quantitative RVEF 45 %.    LEFT ATRIUM: LA cavity size is mildly enlarged.    RIGHT ATRIUM: RA cavity size is normal.    PERICARDIUM: Pericardium is normal. There is no pericardial effusion.    PLEURAL EFFUSION: There is no pleural effusion.    AORTIC VALVE: Aortic valve is trileaflet. There is no aortic regurgitation. There is no aortic stenosis.    MITRAL VALVE: Mitral valve leaflets are normal. There is no mitral regurgitation. There is no mitral stenosis.    TRICUSPID VALVE: Tricuspid valve leaflets are normal. There is no tricuspid regurgitation. There is no tricuspid stenosis.    PULMONIC VALVE: Pulmonic valve leaflets are normal. There is no pulmonic regurgitation. There is no pulmonic stenosis.    AORTIC ROOT: The aortic root is normal.    CHEST: Borderline dilated ascending aorta measuring 4.0 cm.      CORE EXAM   ==========================================================================================================    MEASUREMENTS   ----------------------------------------------------------------------------------------      VOLUMETRIC ANALYSIS       ----------------------------------------------  .-----------------------------------------------------.                 LV   Reference   RV   Reference    +------+--------+-----+------------+-----+------------+   EDV   ml      198   (105-187)  217   (100-200)          ml/m^2   86   (58-93)     95   (52-98)      ESV   ml      101   (25-70)    119   (16-76)            ml/m^2   44   (13-35)     52   (8-37)       CO                                                MASS  g       136   (106-183)                           g/m^2    59   (56-89)                       SV    ml       97   ()    98   ()           ml/m^2   42   (39-63)     43   (36-69)      EF    %        49   (59-77)     45   (57-83)     '------+--------+-----+------------+-----+------------'        LV DIMENSIONS        ----------------------------------------------          WALL THICKNESS - ANTEROSEPTAL:  0.9 cm          WALL THICKNESS - INFEROLATERAL:  0.8 cm          LV MATHEUS:  6.0 cm          LV ESD:  4.8 cm        LA DIMENSIONS (LV SYSTOLE)       ----------------------------------------------          DIAMETER:  5.2 cm          AREA - 2 CHAMBER:  27.4 cm^2          LENGTH - 2 CHAMBER:  4.7 cm          AREA - 4 CHAMBER:  24.7 cm^2          LENGTH - 4 CHAMBER:  4.9 cm          VOLUME:  122 ml          VOLUME NORMALIZED:  53.2 ml/m^2        RA DIMENSIONS (RV SYSTOLE)       ----------------------------------------------          DIAMETER:  4.9 cm          AREA - 4 CHAMBER:  21.7 cm^2          LENGTH - 4 CHAMBER:  4.3 cm      17 SEGMENT   ----------------------------------------------------------------------------------------  .-------------------------------------------------------------------------------------------.   Segments            Wall Motion    Hyperenhancement  Stress Perfusion  Interpretation   +--------------------+---------------+------------------+------------------+----------------+   Base Anterior       Normal/Hyper   None                                Normal            Base Anteroseptal   Normal/Hyper   None                                Normal            Base Inferoseptal   Normal/Hyper   None                                Normal            Base Inferior       Severe Hypo    1-25%                               Sub-Endo MI       Base Inferolateral  Severe Hypo    1-25%                               Sub-Endo MI       Base Anterolateral  Normal/Hyper   None                                Normal            Mid Anterior        Normal/Hyper   None                                Normal            Mid Anteroseptal    Normal/Hyper   None                                Normal            Mid Inferoseptal    Normal/Hyper   None                                 Normal            Mid Inferior        Mild/Mod Hypo  None                                Normal            Mid Inferolateral   Mild/Mod Hypo  None                                Normal            Mid Anterolateral   Normal/Hyper   None                                Normal            Apical Anterior     Normal/Hyper   None                                Normal            Apical Septal       Normal/Hyper   None                                Normal            Apical Inferior     Normal/Hyper   None                                Normal            Apical Lateral      Normal/Hyper   None                                Normal            [place]                Normal/Hyper   None                                Normal           +--------------------+---------------+------------------+------------------+----------------+   RV Segments         Wall Motion    Hyperenhancement                    Interpretation   +--------------------+---------------+------------------+------------------+----------------+   RV Basal Anterior                                                                        RV Basal Inferior                                                                        RV Mid                                                                                   RV Apical                                                                               '--------------------+---------------+------------------+------------------+----------------'        FINDINGS       ----------------------------------------------          LV SCAR SIZE (17 SEGMENT):  2 %      SCAN INFO   ==========================================================================================================    GENERAL   ----------------------------------------------------------------------------------------      SCANNER       ----------------------------------------------           :  SIEMENS          MODEL:  Aera          PULSE SEQUENCES:  SSFP cine, 2D LGE single-shot, Pre-contrast T1 mapping, Post-contrast T1 mapping,          First-pass perfusion without stress, HASTE morphology, Bright-blood SSFP morphology         CONTRAST AGENT       ----------------------------------------------          TYPE:  Gadavist          GD CONCENTRATION:  1 M          VOLUME ADMINISTERED:  17 ml          DOSAGE:  0.17 mmol/kg        SETUP       ----------------------------------------------          REFERRING PHYSICIAN:  BRANDON URBAN          ATTENDING PHYSICIAN:  JSOE MEADOWS        BILLMEGAN   ==========================================================================================================    CPT Codes      16676  ICD10 Codes      I25.5      Report generated by [employer], a product of [employer]       Discharge Medications   Current Discharge Medication List      START taking these medications    Details   aspirin (ASA) 81 MG chewable tablet Take 1 tablet (81 mg) by mouth daily for 30 days  Qty: 30 tablet, Refills: 0    Associated Diagnoses: NSTEMI (non-ST elevated myocardial infarction) (H)      atorvastatin (LIPITOR) 40 MG tablet Take 1 tablet (40 mg) by mouth daily for 30 days  Qty: 30 tablet, Refills: 0    Associated Diagnoses: NSTEMI (non-ST elevated myocardial infarction) (H)      isosorbide mononitrate (IMDUR) 30 MG 24 hr tablet Take 1 tablet (30 mg) by mouth daily for 30 days DO NOT CRUSH. Can split tablet in half along score therese.  Qty: 30 tablet, Refills: 0    Associated Diagnoses: NSTEMI (non-ST elevated myocardial infarction) (H)      metoprolol succinate ER (TOPROL XL) 25 MG 24 hr tablet Take 1 tablet (25 mg) by mouth daily for 30 days  Qty: 30 tablet, Refills: 0    Associated Diagnoses: NSTEMI (non-ST elevated myocardial infarction) (H)      nitroGLYcerin (NITROSTAT) 0.4 MG sublingual tablet One tablet under the tongue every 5 minutes if needed for  "chest pain. May repeat every 5 minutes for a maximum of 3 doses in 15 minutes\"  Qty: 25 tablet, Refills: 3    Associated Diagnoses: NSTEMI (non-ST elevated myocardial infarction) (H)         CONTINUE these medications which have NOT CHANGED    Details   cholecalciferol (VITAMIN D3) 125 mcg (5000 units) capsule Take 125 mcg by mouth daily      lisinopril (PRINIVIL,ZESTRIL) 10 MG tablet [LISINOPRIL (PRINIVIL,ZESTRIL) 10 MG TABLET] Take 10 mg by mouth daily.      pantoprazole (PROTONIX) 40 MG tablet [PANTOPRAZOLE (PROTONIX) 40 MG TABLET] Take 40 mg by mouth daily.      vitamin E (TOCOPHEROL) 400 units (180 mg) capsule Take 400 Units by mouth daily           Allergies   No Known Allergies  "

## 2022-07-27 NOTE — PLAN OF CARE
Problem: Chest Pain  Goal: Resolution of Chest Pain Symptoms  Outcome: Ongoing, Progressing       Problem: Arrhythmia/Dysrhythmia (Cardiac Catheterization)  Goal: Stable Heart Rate and Rhythm  Outcome: Ongoing, Progressing     Goal Outcome Evaluation:  Patient is aox4. Denies chest pain and SOB. Has NSR. VS stable. /66 and 99/62. Strong pulse noted and no hematoma at angiogram site/right radial. Patient is independent in the room. Calls appropriately. Patient NPO at midnight for cardiac MRI. Cardiac MRI completed this morning.

## 2022-07-28 DIAGNOSIS — Z71.89 OTHER SPECIFIED COUNSELING: ICD-10-CM

## 2022-07-29 ENCOUNTER — TELEPHONE (OUTPATIENT)
Dept: CARDIOLOGY | Facility: CLINIC | Age: 72
End: 2022-07-29

## 2022-07-29 DIAGNOSIS — I25.118 CORONARY ARTERY DISEASE OF NATIVE ARTERY OF NATIVE HEART WITH STABLE ANGINA PECTORIS (H): ICD-10-CM

## 2022-07-29 DIAGNOSIS — I21.4 NSTEMI (NON-ST ELEVATED MYOCARDIAL INFARCTION) (H): ICD-10-CM

## 2022-07-29 DIAGNOSIS — I25.82 CHRONIC TOTAL OCCLUSION OF CORONARY ARTERY: Primary | ICD-10-CM

## 2022-07-29 NOTE — TELEPHONE ENCOUNTER
----- Message from Quentin Miranda MD sent at 7/29/2022  8:51 AM CDT -----  Anna;    I did a cath on this pt who has  of rca.  Can you arrange for him to come in for a  pci with mudassar electively on lab day here?  Thanks    PTK

## 2022-07-29 NOTE — TELEPHONE ENCOUNTER
===View-only below this line===  ----- Message -----  From: Jude Haro MD  Sent: 7/29/2022  11:41 AM CDT  To: Karin Brown RN, Bg Handy RN    If the patient would like to come in for clinic visit prior to the procedure, we can set that up, otherwise if he is comfortable being directly scheduled for the PCI, that is fine with me.  PCI can be scheduled for August 24, when the new labs are open.    Thank you

## 2022-07-29 NOTE — TELEPHONE ENCOUNTER
Case request placed. Dr. Haro please review.   Would you like in office appointment with the patient prior to ? Any advisement on next day for  scheduling? Please advise. CMM,Rn

## 2022-08-01 PROBLEM — E78.5 DYSLIPIDEMIA, GOAL LDL BELOW 70: Status: ACTIVE | Noted: 2022-08-01

## 2022-08-01 NOTE — PROGRESS NOTES
Assessment/Recommendations   History and physical for coronary angiogram with possible percutaneous coronary intervention    Assessment/Plan:  1.  Coronary artery disease: Patient was hospitalized from July 24 through July 27 with chest pain secondary to non-STEMI.  Coronary angiogram on 7/25/2022 showed 100% chronic occlusion in the right coronary artery.  Echocardiogram showed mild hypokinesis in inferolateral wall.  Cardiac MRI showed high probability of viability in the territory of RCA.  Patient was recommended staged PCI for chronic total occlusion of RCA with Dr. Haro.  Per Dr. Haro, if patient prefers he can see patient as an outpatient for consultation and then schedule procedure.This was discussed with patient and his wife today.  He feels comfortable and would like to proceed with staged PCI directly with Dr. Haro.    He is currently on aspirin, atorvastatin, metoprolol, and isosorbide mononitrate.  He is also on as needed nitroglycerin.  He is experiencing mild headache during the night as he has been taking isosorbide at night.    Discussed about the indication for coronary angiogram/possible PCI and the risks associated with angiogram including <0.1% of MI and CVA or death or any of the following to the degree that it could threaten her life: allergic reaction, arrhythmia, renal failure, hemorrhage, thrombosis and infection.  Risk associated with therapeutic intervention includes 2% or less risk of MI, less than 1% risk of CVA, emergency her surgery, death, less than 4% risk of vascular injury requiring surgical repair or blood transfusion with 20-30% risk of restenosis with a bare-metal stent and less than 10% risk of stenosis with a drug-eluting stent.     Recent lab work including BMP and CBC were unremarkable.   Patient  met with FU[facility] Pozo  for pre-angio teach.              2.  Dyslipidemia: Dilip Frye is on high intensity statin therapy with 40 mg daily. His most recent LDL is 119  and triglycerides 177 in July 2022.  Most recent AST/ALT are stable.    3.  Hypertension: His blood pressure is well controlled-he is on lisinopril 10 mg daily, metoprolol succinate 25 mg daily, and isosorbide.  His PCP told him that he can cut back on lisinopril from 10 mg to 5 mg if needed for symptomatic low blood pressure.    4.  Sinus bradycardia: Heart rate today stable in 70s.  He reports his heart rates been stable in 60s at home.    Plan:  - Continue ASA,  Imdur, and PRN nitroglycerin  -Given direction on how to use as needed nitroglycerin.  -Instructed to take isosorbide in the morning.  -Okay to take Tylenol if needed for headache  -Instructed to avoid NSAIDs given cardiovascular disease and risk of bleeding  - Avoid strenuous exercise or lifting heavy objects until further direction  - Please seek medical attention if persistent worsening chest pain/tightness/pressure, shortness of breath, lightheaded or dizziness  -Continue statin therapy  -Continue current hypertension regimen.  Patient will monitor his blood pressure and heart rate and will contact us or PCP if having issue with low blood pressure and low heart rate.  -Covid test as per protocol    He is going to cancel his cardiac rehab appointment until he completes his staged PCI which I think is reasonable.     History of Present Illness/Subjective    Dilip Frye is a 72 year old years old with recent diagnosis of coronary artery disease with known chronic total occlusion of RCA, hypertension, dyslipidemia, sinus bradycardia, GERD, and hyponatremia who is seen at [facility] for coronary angiogram/posthospitalization follow-up.    Patient was hospitalized from July 24 through July 27 with chest pain secondary to non-STEMI.  Coronary angiogram showed 100% chronic occlusion in the right coronary artery.  Echocardiogram showed mild hypokinesis in inferolateral wall.  Cardiac MRI showed high probability of viability in the  territory of RCA.  Patient was recommended staged PCI for chronic total occlusion of RCA with Dr. Haro.  He was initiated on aspirin, atorvastatin, metoprolol, and isosorbide mononitrate.  He is also on as needed nitroglycerin.    Today, patient is here accompanied by his wife.  He reports significant improvement in his chest pain since recent hospitalization.  He is experiencing some headache during the night as he has been taking isosorbide at night.  He still has mild occasional chest discomfort rating 2-3/10. He denies fatigue, lightheadedness, shortness of breath, dyspnea on exertion, orthopnea, PND, palpitations, abdominal fullness/bloating and lower extremity edema. He further denies fever, chills, N/V, diarrhea, vomiting, constipation, hematochezia, hematemesis, hemoptysis. He denies bleeding or clotting disorders, COPD/Asthma/PANCHO, any major reactions to anesthesia or IV contrast.    Cardiac MRI on 7/26/2022-reviewed  SUMMARY  1.  Left ventricular size is at the upper limits of normal. Wall thickness is normal. Systolic function is  mildly reduced with inferior and inferolateral wall hypokinesis. The quantified left ventricular ejection  fraction is 49%.   2.  Moderate-sized area of myocardial edema and decreased resting perfusion in the [id number] inferior  and inferolateral wall consistent with acute-to-subacute myocardial infarction in the territory of the  right coronary artery.  3.  Minimal subendocardial scar extending to 1-25% the thickness of the basal inferior and inferolateral  walls suggesting a high probability of viability in the the territory of the right coronary artery.   4.  Right ventricular size is mildly enlarged. Systolic function is mildly reduced.  The quantified right  ventricular ejection fraction is 45%.   5.  Mild left atrial enlargement.  6.  No significant valvular abnormalities.    Echocardiogram on 7/24/2022-reviewed  Interpretation Summary     1. Left ventricular size and  wall thickness are normal. There is mild  inferolateral wall hypokinesis although overall systolic function is normal.  The calculated left ventricular ejection fraction is 57%.  2. Right ventricular size and systolic function are normal.  3. No hemodynamically significant valvular abnormalities.  4. No prior study available for comparison.    Coronary Angiogram done on 7/25/2022-reviewed:  Conclusion    Left ventricular filling pressures are normal.    Prox LAD to Mid LAD lesion is 20% stenosed.    Prox RCA to Mid RCA lesion is 100% stenosed.    Global LV function mild reduction with akintis inferobase   Plan    Follow bedrest per protocol    Continued medical management and lifestyle modifications for cardiovascular risk factor optimizations.    Arterial sheath removed from radial artery with TR band placement.  Patient with total occlusion of late proximal RCA.  RCA appears to be a large-caliber vessel.  LV suggests akinesis of the inferior base.  Considerations may be to evaluate for extent of viability of the madelyn-infarct region.  If sizable viable tissue remains could be candidate for  PCI.  For now maximize medical therapy beta-blockers, nitrates, and risk factor reduction.  If minimal viable tissue is present and patient asymptomatic with defer revascularization considerations     Recommendations  General Recommendations:  - Patient given specific instructions regarding care of arteriotomy site, activity restrictions, signs and symptoms of cardiac or vascular complications and to seek immediate medical evaluation should they occur.   - Arterial sheath removed from radial artery with TR Band placement.        Physical Examination Review of Systems   /60 (BP Location: Left arm, Patient Position: Sitting, Cuff Size: Adult Large)   Pulse 71   Resp 14   Wt 100.2 kg (221 lb)   BMI 26.90 kg/m    Body mass index is 26.9 kg/m .  Wt Readings from Last 3 Encounters:   08/02/22 100.2 kg (221 lb)   07/27/22  98.8 kg (217 lb 12.8 oz)   12/10/14 107.5 kg (237 lb)     General Appearance:   no distress, normal body habitus   ENT/Mouth: membranes moist, no oral lesions or bleeding gums.      EYES:  no scleral icterus, normal conjunctivae   Neck: no carotid bruits or thyromegaly   Chest/Lungs:   lungs are clear to auscultation, no rales or wheezing, equal chest wall expansion    Cardiovascular:   Heart rate regular. Normal first and second heart sounds with no murmurs, rubs, or gallops; the carotid, radial and posterior tibial pulses are intact, Jugular venous pressure flat with no edema bilaterally    Abdomen:  no organomegaly, masses, bruits, or tenderness; bowel sounds are present   Extremities   no cyanosis or clubbing. Right radial puncture site intact. Radial pulses and Pedal pulses intact and symmetrical.  CMS intact.   Skin: no xanthelasma, warm.    Neurologic: normal  bilateral, no tremors   Psychiatric: alert and oriented x3, calm               Negative unless noted in HPI     Medical History  Surgical History Family History Social History   Past Medical History:   Diagnosis Date     Benign essential hypertension      Gastroesophageal reflux disease with esophagitis     Past Surgical History:   Procedure Laterality Date     CERVICAL LAMINECTOMY       CV CORONARY ANGIOGRAM N/A 7/25/2022    Procedure: CV CORONARY ANGIOGRAM;  Surgeon: Quentin Miranda MD;  Location: [facility] CV     CV LEFT HEART CATH N/A 7/25/2022    Procedure: Left Heart Catheterization;  Surgeon: Quentin Miranda MD;  Location: [facility] CV     CV LEFT VENTRICULOGRAM N/A 7/25/2022    Procedure: Left Ventriculogram;  Surgeon: Quentin Miranda MD;  Location: [facility] CV    Family History   Problem Relation Age of Onset     Brain Cancer Mother      No Known Problems Father      Coronary Artery Disease Early Onset No family hx of     Social History     Socioeconomic History     Marital status:      Spouse name:  "Not on file     Number of children: Not on file     Years of education: Not on file     Highest education level: Not on file   Occupational History     Not on file   Tobacco Use     Smoking status: Never Smoker     Smokeless tobacco: Never Used   Substance and Sexual Activity     Alcohol use: Yes     Alcohol/week: 1.0 standard drink     Comment: Alcoholic Drinks/day: One drink twice a month per pt     Drug use: No     Sexual activity: Not on file   Other Topics Concern     Not on file   Social History Narrative    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     Social Determinants of Health     Financial Resource Strain: Not on file   Food Insecurity: Not on file   Transportation Needs: Not on file   Physical Activity: Not on file   Stress: Not on file   Social Connections: Not on file   Intimate Partner Violence: Not on file   Housing Stability: Not on file          Medications  Allergies   Current Outpatient Medications   Medication Sig Dispense Refill     aspirin (ASA) 81 MG chewable tablet Take 1 tablet (81 mg) by mouth daily for 30 days 30 tablet 0     atorvastatin (LIPITOR) 40 MG tablet Take 1 tablet (40 mg) by mouth daily for 30 days 30 tablet 0     cholecalciferol (VITAMIN D3) 125 mcg (5000 units) capsule Take 125 mcg by mouth daily       isosorbide mononitrate (IMDUR) 30 MG 24 hr tablet Take 1 tablet (30 mg) by mouth daily for 30 days DO NOT CRUSH. Can split tablet in half along score therese. 30 tablet 0     lisinopril (PRINIVIL,ZESTRIL) 10 MG tablet [LISINOPRIL (PRINIVIL,ZESTRIL) 10 MG TABLET] Take 10 mg by mouth daily.       metoprolol succinate ER (TOPROL XL) 25 MG 24 hr tablet Take 1 tablet (25 mg) by mouth daily for 30 days 30 tablet 0     nitroGLYcerin (NITROSTAT) 0.4 MG sublingual tablet One tablet under the tongue every 5 minutes if needed for chest pain. May repeat every 5 minutes for a maximum of 3 doses in 15 minutes\" 25 tablet 3     pantoprazole (PROTONIX) 40 MG tablet [PANTOPRAZOLE " (PROTONIX) 40 MG TABLET] Take 40 mg by mouth daily.       vitamin E (TOCOPHEROL) 400 units (180 mg) capsule Take 400 Units by mouth daily      No Known Allergies      Lab Results    Chemistry/lipid CBC Cardiac Enzymes/BNP/TSH/INR   Lab Results   Component Value Date    CHOL 184 07/25/2022    HDL 31 (L) 07/25/2022    TRIG 171 (H) 07/25/2022    BUN 15 07/26/2022     (L) 07/26/2022    CO2 24 07/26/2022    Lab Results   Component Value Date    WBC 5.8 07/27/2022    HGB 12.9 (L) 07/27/2022    HCT 37.3 (L) 07/27/2022    MCV 89 07/27/2022     07/27/2022    Lab Results   Component Value Date    TROPONINI 7.53 (HH) 07/25/2022    BNP 24 07/24/2022    TSH 2.04 07/25/2022    INR 1.01 07/24/2022        42 minutes spent on the date of encounter doing chart review, review of test results, interpretation with above tests, patient visit, documentation and discussion with family.        This note has been dictated using voice recognition software. Any grammatical, typographical, or context distortions are unintentional and inherent to the software

## 2022-08-01 NOTE — H&P (VIEW-ONLY)
Assessment/Recommendations   History and physical for coronary angiogram with possible percutaneous coronary intervention    Assessment/Plan:  1.  Coronary artery disease: Patient was hospitalized from July 24 through July 27 with chest pain secondary to non-STEMI.  Coronary angiogram on 7/25/2022 showed 100% chronic occlusion in the right coronary artery.  Echocardiogram showed mild hypokinesis in inferolateral wall.  Cardiac MRI showed high probability of viability in the territory of RCA.  Patient was recommended staged PCI for chronic total occlusion of RCA with Dr. Haro.  Per Dr. Haro, if patient prefers he can see patient as an outpatient for consultation and then schedule procedure.This was discussed with patient and his wife today.  He feels comfortable and would like to proceed with staged PCI directly with Dr. Haro.    He is currently on aspirin, atorvastatin, metoprolol, and isosorbide mononitrate.  He is also on as needed nitroglycerin.  He is experiencing mild headache during the night as he has been taking isosorbide at night.    Discussed about the indication for coronary angiogram/possible PCI and the risks associated with angiogram including <0.1% of MI and CVA or death or any of the following to the degree that it could threaten her life: allergic reaction, arrhythmia, renal failure, hemorrhage, thrombosis and infection.  Risk associated with therapeutic intervention includes 2% or less risk of MI, less than 1% risk of CVA, emergency her surgery, death, less than 4% risk of vascular injury requiring surgical repair or blood transfusion with 20-30% risk of restenosis with a bare-metal stent and less than 10% risk of stenosis with a drug-eluting stent.     Recent lab work including BMP and CBC were unremarkable.   Patient  met with FUAD Pozo  for pre-angio teach.              2.  Dyslipidemia: Dilip Frye is on high intensity statin therapy with 40 mg daily. His most recent LDL is 119  and triglycerides 177 in July 2022.  Most recent AST/ALT are stable.    3.  Hypertension: His blood pressure is well controlled-he is on lisinopril 10 mg daily, metoprolol succinate 25 mg daily, and isosorbide.  His PCP told him that he can cut back on lisinopril from 10 mg to 5 mg if needed for symptomatic low blood pressure.    4.  Sinus bradycardia: Heart rate today stable in 70s.  He reports his heart rates been stable in 60s at home.    Plan:  - Continue ASA,  Imdur, and PRN nitroglycerin  -Given direction on how to use as needed nitroglycerin.  -Instructed to take isosorbide in the morning.  -Okay to take Tylenol if needed for headache  -Instructed to avoid NSAIDs given cardiovascular disease and risk of bleeding  - Avoid strenuous exercise or lifting heavy objects until further direction  - Please seek medical attention if persistent worsening chest pain/tightness/pressure, shortness of breath, lightheaded or dizziness  -Continue statin therapy  -Continue current hypertension regimen.  Patient will monitor his blood pressure and heart rate and will contact us or PCP if having issue with low blood pressure and low heart rate.  -Covid test as per protocol    He is going to cancel his cardiac rehab appointment until he completes his staged PCI which I think is reasonable.     History of Present Illness/Subjective    Dilip Frye is a 72 year old years old with recent diagnosis of coronary artery disease with known chronic total occlusion of RCA, hypertension, dyslipidemia, sinus bradycardia, GERD, and hyponatremia who is seen at [facility] for coronary angiogram/posthospitalization follow-up.    Patient was hospitalized from July 24 through July 27 with chest pain secondary to non-STEMI.  Coronary angiogram showed 100% chronic occlusion in the right coronary artery.  Echocardiogram showed mild hypokinesis in inferolateral wall.  Cardiac MRI showed high probability of viability in the  territory of RCA.  Patient was recommended staged PCI for chronic total occlusion of RCA with Dr. Haro.  He was initiated on aspirin, atorvastatin, metoprolol, and isosorbide mononitrate.  He is also on as needed nitroglycerin.    Today, patient is here accompanied by his wife.  He reports significant improvement in his chest pain since recent hospitalization.  He is experiencing some headache during the night as he has been taking isosorbide at night.  He still has mild occasional chest discomfort rating 2-3/10. He denies fatigue, lightheadedness, shortness of breath, dyspnea on exertion, orthopnea, PND, palpitations, abdominal fullness/bloating and lower extremity edema. He further denies fever, chills, N/V, diarrhea, vomiting, constipation, hematochezia, hematemesis, hemoptysis. He denies bleeding or clotting disorders, COPD/Asthma/PANCHO, any major reactions to anesthesia or IV contrast.    Cardiac MRI on 7/26/2022-reviewed  SUMMARY  1.  Left ventricular size is at the upper limits of normal. Wall thickness is normal. Systolic function is  mildly reduced with inferior and inferolateral wall hypokinesis. The quantified left ventricular ejection  fraction is 49%.   2.  Moderate-sized area of myocardial edema and decreased resting perfusion in the aktpu-ni-ons inferior  and inferolateral wall consistent with acute-to-subacute myocardial infarction in the territory of the  right coronary artery.  3.  Minimal subendocardial scar extending to 1-25% the thickness of the basal inferior and inferolateral  walls suggesting a high probability of viability in the the territory of the right coronary artery.   4.  Right ventricular size is mildly enlarged. Systolic function is mildly reduced.  The quantified right  ventricular ejection fraction is 45%.   5.  Mild left atrial enlargement.  6.  No significant valvular abnormalities.    Echocardiogram on 7/24/2022-reviewed  Interpretation Summary     1. Left ventricular size and  wall thickness are normal. There is mild  inferolateral wall hypokinesis although overall systolic function is normal.  The calculated left ventricular ejection fraction is 57%.  2. Right ventricular size and systolic function are normal.  3. No hemodynamically significant valvular abnormalities.  4. No prior study available for comparison.    Coronary Angiogram done on 7/25/2022-reviewed:  Conclusion    Left ventricular filling pressures are normal.    Prox LAD to Mid LAD lesion is 20% stenosed.    Prox RCA to Mid RCA lesion is 100% stenosed.    Global LV function mild reduction with akintis inferobase   Plan    Follow bedrest per protocol    Continued medical management and lifestyle modifications for cardiovascular risk factor optimizations.    Arterial sheath removed from radial artery with TR band placement.  Patient with total occlusion of late proximal RCA.  RCA appears to be a large-caliber vessel.  LV suggests akinesis of the inferior base.  Considerations may be to evaluate for extent of viability of the madelyn-infarct region.  If sizable viable tissue remains could be candidate for  PCI.  For now maximize medical therapy beta-blockers, nitrates, and risk factor reduction.  If minimal viable tissue is present and patient asymptomatic with defer revascularization considerations     Recommendations  General Recommendations:  - Patient given specific instructions regarding care of arteriotomy site, activity restrictions, signs and symptoms of cardiac or vascular complications and to seek immediate medical evaluation should they occur.   - Arterial sheath removed from radial artery with TR Band placement.        Physical Examination Review of Systems   /60 (BP Location: Left arm, Patient Position: Sitting, Cuff Size: Adult Large)   Pulse 71   Resp 14   Wt 100.2 kg (221 lb)   BMI 26.90 kg/m    Body mass index is 26.9 kg/m .  Wt Readings from Last 3 Encounters:   08/02/22 100.2 kg (221 lb)   07/27/22  98.8 kg (217 lb 12.8 oz)   12/10/14 107.5 kg (237 lb)     General Appearance:   no distress, normal body habitus   ENT/Mouth: membranes moist, no oral lesions or bleeding gums.      EYES:  no scleral icterus, normal conjunctivae   Neck: no carotid bruits or thyromegaly   Chest/Lungs:   lungs are clear to auscultation, no rales or wheezing, equal chest wall expansion    Cardiovascular:   Heart rate regular. Normal first and second heart sounds with no murmurs, rubs, or gallops; the carotid, radial and posterior tibial pulses are intact, Jugular venous pressure flat with no edema bilaterally    Abdomen:  no organomegaly, masses, bruits, or tenderness; bowel sounds are present   Extremities   no cyanosis or clubbing. Right radial puncture site intact. Radial pulses and Pedal pulses intact and symmetrical.  CMS intact.   Skin: no xanthelasma, warm.    Neurologic: normal  bilateral, no tremors   Psychiatric: alert and oriented x3, calm               Negative unless noted in HPI     Medical History  Surgical History Family History Social History   Past Medical History:   Diagnosis Date     Benign essential hypertension      Gastroesophageal reflux disease with esophagitis     Past Surgical History:   Procedure Laterality Date     CERVICAL LAMINECTOMY       CV CORONARY ANGIOGRAM N/A 7/25/2022    Procedure: CV CORONARY ANGIOGRAM;  Surgeon: Quentin Miranda MD;  Location: [facility] CV     CV LEFT HEART CATH N/A 7/25/2022    Procedure: Left Heart Catheterization;  Surgeon: Quentin Miranda MD;  Location: [facility] CV     CV LEFT VENTRICULOGRAM N/A 7/25/2022    Procedure: Left Ventriculogram;  Surgeon: Quentin Miranda MD;  Location: [facility] CV    Family History   Problem Relation Age of Onset     Brain Cancer Mother      No Known Problems Father      Coronary Artery Disease Early Onset No family hx of     Social History     Socioeconomic History     Marital status:      Spouse name:  "Not on file     Number of children: Not on file     Years of education: Not on file     Highest education level: Not on file   Occupational History     Not on file   Tobacco Use     Smoking status: Never Smoker     Smokeless tobacco: Never Used   Substance and Sexual Activity     Alcohol use: Yes     Alcohol/week: 1.0 standard drink     Comment: Alcoholic Drinks/day: One drink twice a month per pt     Drug use: No     Sexual activity: Not on file   Other Topics Concern     Not on file   Social History Narrative    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     Social Determinants of Health     Financial Resource Strain: Not on file   Food Insecurity: Not on file   Transportation Needs: Not on file   Physical Activity: Not on file   Stress: Not on file   Social Connections: Not on file   Intimate Partner Violence: Not on file   Housing Stability: Not on file          Medications  Allergies   Current Outpatient Medications   Medication Sig Dispense Refill     aspirin (ASA) 81 MG chewable tablet Take 1 tablet (81 mg) by mouth daily for 30 days 30 tablet 0     atorvastatin (LIPITOR) 40 MG tablet Take 1 tablet (40 mg) by mouth daily for 30 days 30 tablet 0     cholecalciferol (VITAMIN D3) 125 mcg (5000 units) capsule Take 125 mcg by mouth daily       isosorbide mononitrate (IMDUR) 30 MG 24 hr tablet Take 1 tablet (30 mg) by mouth daily for 30 days DO NOT CRUSH. Can split tablet in half along score therese. 30 tablet 0     lisinopril (PRINIVIL,ZESTRIL) 10 MG tablet [LISINOPRIL (PRINIVIL,ZESTRIL) 10 MG TABLET] Take 10 mg by mouth daily.       metoprolol succinate ER (TOPROL XL) 25 MG 24 hr tablet Take 1 tablet (25 mg) by mouth daily for 30 days 30 tablet 0     nitroGLYcerin (NITROSTAT) 0.4 MG sublingual tablet One tablet under the tongue every 5 minutes if needed for chest pain. May repeat every 5 minutes for a maximum of 3 doses in 15 minutes\" 25 tablet 3     pantoprazole (PROTONIX) 40 MG tablet [PANTOPRAZOLE " (PROTONIX) 40 MG TABLET] Take 40 mg by mouth daily.       vitamin E (TOCOPHEROL) 400 units (180 mg) capsule Take 400 Units by mouth daily      No Known Allergies      Lab Results    Chemistry/lipid CBC Cardiac Enzymes/BNP/TSH/INR   Lab Results   Component Value Date    CHOL 184 07/25/2022    HDL 31 (L) 07/25/2022    TRIG 171 (H) 07/25/2022    BUN 15 07/26/2022     (L) 07/26/2022    CO2 24 07/26/2022    Lab Results   Component Value Date    WBC 5.8 07/27/2022    HGB 12.9 (L) 07/27/2022    HCT 37.3 (L) 07/27/2022    MCV 89 07/27/2022     07/27/2022    Lab Results   Component Value Date    TROPONINI 7.53 (HH) 07/25/2022    BNP 24 07/24/2022    TSH 2.04 07/25/2022    INR 1.01 07/24/2022        42 minutes spent on the date of encounter doing chart review, review of test results, interpretation with above tests, patient visit, documentation and discussion with family.        This note has been dictated using voice recognition software. Any grammatical, typographical, or context distortions are unintentional and inherent to the software

## 2022-08-02 ENCOUNTER — PATIENT OUTREACH (OUTPATIENT)
Dept: CARE COORDINATION | Facility: CLINIC | Age: 72
End: 2022-08-02

## 2022-08-02 ENCOUNTER — OFFICE VISIT (OUTPATIENT)
Dept: CARDIOLOGY | Facility: CLINIC | Age: 72
End: 2022-08-02
Payer: COMMERCIAL

## 2022-08-02 VITALS
SYSTOLIC BLOOD PRESSURE: 108 MMHG | RESPIRATION RATE: 14 BRPM | WEIGHT: 221 LBS | HEART RATE: 71 BPM | DIASTOLIC BLOOD PRESSURE: 60 MMHG | BODY MASS INDEX: 26.9 KG/M2

## 2022-08-02 DIAGNOSIS — I25.118 CORONARY ARTERY DISEASE OF NATIVE ARTERY OF NATIVE HEART WITH STABLE ANGINA PECTORIS (H): ICD-10-CM

## 2022-08-02 DIAGNOSIS — R00.1 SINUS BRADYCARDIA: ICD-10-CM

## 2022-08-02 DIAGNOSIS — E78.5 DYSLIPIDEMIA, GOAL LDL BELOW 70: ICD-10-CM

## 2022-08-02 DIAGNOSIS — I10 BENIGN ESSENTIAL HYPERTENSION: ICD-10-CM

## 2022-08-02 DIAGNOSIS — I21.4 NSTEMI (NON-ST ELEVATED MYOCARDIAL INFARCTION) (H): Primary | ICD-10-CM

## 2022-08-02 PROCEDURE — 99215 OFFICE O/P EST HI 40 MIN: CPT | Performed by: NURSE PRACTITIONER

## 2022-08-02 NOTE — TELEPHONE ENCOUNTER
----- Message -----  From: Nohelia Green  Sent: 8/2/2022  11:42 AM CDT  To: Jude Haro MD  Subject: FW:  with AHMED only                          Hi Dr. Haro    Anna is out for the next two weeks and I am trying to assist in getting this arranged for this patient. Would you like reps for the case?    Thank you,    Nohelia MERIDA  ----- Message -----  From: London Sarmiento  Sent: 8/2/2022  10:37 AM CDT  To: Bg Handy RN  Subject: RE:  with AHMED only                          Would Dr. Haro like to have any reps for this case?    ----- Message -----  From: Bg Handy, RN  Sent: 8/2/2022  10:04 AM CDT  To: London Aguilera  Subject:  with AHMED only                              Hello, please  Call the patient, to schedule for  of the RCA with Tahir only    Alerts: None  May need H&P, unless he would like HEATHER page MA, prior to procedure.     Per Dr. Haro would want scheduled AFTER new suites opened, first next available 8/24/22.     Thank youCAROLINA,RN

## 2022-08-02 NOTE — TELEPHONE ENCOUNTER
[facility] Call Center    Phone Message    May a detailed message be left on voicemail: no     Reason for Call: Other: Beata is returning a missed phone call, please reach back out to her at [phone].     Action Taken: Other: [facility]    Travel Screening: Not Applicable

## 2022-08-02 NOTE — TELEPHONE ENCOUNTER
Spoke with Kera Zuñiga regarding today's appointment and to collect H and P. Will advise patient of  and plan of care. Case is in.

## 2022-08-02 NOTE — PATIENT INSTRUCTIONS
Dilip Frye,    It was a pleasure to see you today at the [facility].     My recommendations after this visit include:    - Take isosorbide in the morning    - Okay to take Tylenol as needed     - Avoid Ibuprofen/advil/aleve     - Avoid high intense exercise or lifting heaving object     - Please seek medical attention if you develop recurrent chest pain or shortness of breath or similar symptoms you experienced prior to recent cardiac event    - Please call [phone], if you have any questions or concerns    Kera Zuñiga CNP    Medication     Take all your medications as prescribed  Do not stop any medications without talking with a healthcare provider    Exercise      Physical activity is important for overall health  Set a goal of 150 minutes of exercise each week  For example, 30 minutes of exercise 5 days each week.    These 30 minutes can be broken into shorter periods of 15 minutes twice daily or 10 minutes three times daily  Start any exercise program slowly and work towards the goal of 150 minutes each week  For example, you may start with 10 minutes and plan to add a few minutes each week as you get stronger   Examples of exercise include walking, swimming, or biking  Remember to stretch and stay hydrated with exercise    Diet     A heart healthy diet includes:  A variety of fruits and vegetables  Whole grains  Low-fat dairy (fat-free, 1% fat, and low-fat)  Lean meats and poultry without skin   Fish (eat fish 2 times each week)  Nuts  Limit saturated fat to about 13 grams each day (based on a 2000 calorie diet)  Limit red meat  Limit sugars (sweets and sugary beverages)  Limit your portion sizes  Do not add salt to your food when cooking or at the table  Limit alcohol intake (no more than 1 drink each day for women or 2 drinks each day for men)    Weight Loss     Work on losing weight with diet and exercise  You BMI (body mass index) should be between 18.5-24.9  This is a  calculation of your weight and height  Please ask your healthcare provider for your BMI    Manage Other Chronic Health Conditions     Control cholesterol  Eat a diet low in saturated fat  Exercise   Take a statin medication as prescribed  Manage blood pressure  Eat a diet low in sodium  Exercise  Reduce stress  Lose weight   Take blood pressure medications as prescribed  Control blood sugars if diabetic  Monitor sugars and carbohydrates in your diet  Lose weight   Take diabetes medications as prescribed  Follow-up with your primary care provider to make sure your blood sugars are well controlled    Stress Reduction     Find time each day to relax  Reading, listening to music, yoga, meditation, exercise, spending time with friends and family, volunteering   Get 6-8 hours of sleep each night    Smoking Cessation     Smoking causes numerous health problems including coronary artery disease  It is never too late to quit  Set realistic goals for quitting  Decrease the number of cigarettes used each week  Use nicotine gum or patches to help you quit    Information from the American Heart Association.  Please visit their website at www.heart.org

## 2022-08-02 NOTE — TELEPHONE ENCOUNTER
Saw patient in clinic to discuss  with pci. Provided general education regarding procedure. Confirmed h&p completed with Kera. Additionally pt does not require an appointment with Dr. Haro and wishes to be scheduled for procedure on 08/24. Advised will notify schedulers and contact patient with time and education. No further questions noted. -dulce

## 2022-08-02 NOTE — TELEPHONE ENCOUNTER
PC to patient and LVM to call the clinic to discuss plan of care and next steps. [phone].   Pt needs discussion on update of plan of care.  with Dr. Haro, and offer if would like to have a clinic visit prior to , next available with Dr. Haro is 8/24/22. Await return call to discuss. Staff message sent to Procedure schedulers to make aware of plan. Case request is in place. CAROLINA,Rn

## 2022-08-02 NOTE — PROGRESS NOTES
Clinic Care Coordination Contact  Care Team Conversations    Patient identified for care management outreach, however Sara RN staff has already followed up with patient to ensure they are following up with PCP and have needs and resources met. Clinic RN will refer back to KATE MONTGOMERY if needed/appropriate.    KOSTA Asif  Social Work Care Coordinator - [facility]  Care Coordination  [email or web address]  Cell Phone: [phone]  Gender pronouns: she/her  Employed by [employer]

## 2022-08-15 NOTE — TELEPHONE ENCOUNTER
===View-only below this line===  ----- Message -----  From: London Sarmiento  Sent: 8/4/2022  10:04 AM CDT  To: Nohelia Green, Bg Handy RN  Subject: RE:  with AHMED only                            Case type:  OF THE RCA    Procedure Physician(s): TYRELL LEAL    Procedure Date and Patient Arrival Time: 8/24 630 ARRIVAL    H&P: 8/2 CHIME    Alerts: NONE    COVID Testing: AT HOME    THANK YOU,  CARLY

## 2022-08-19 NOTE — TELEPHONE ENCOUNTER
"PC to patient and LVm, to return call [phone]. PC with wife and patient at home number. Verbal ok from patient to speak with his wife regarding procedure and medical appts/instructions, etc. Pt agreeable to receive education via e-mail. Confirmed e-mail address on file. Discussion with wife of medications. Reports that patient took himself off Lisinopril, as his BP readings' were too low\" and now they are better. Med compliant with other medications.CAROLINA,RN   "

## 2022-08-23 ENCOUNTER — PREP FOR PROCEDURE (OUTPATIENT)
Dept: CARDIOLOGY | Facility: CLINIC | Age: 72
End: 2022-08-23

## 2022-08-23 DIAGNOSIS — I25.118 CORONARY ARTERY DISEASE OF NATIVE ARTERY OF NATIVE HEART WITH STABLE ANGINA PECTORIS (H): ICD-10-CM

## 2022-08-23 DIAGNOSIS — I10 BENIGN ESSENTIAL HYPERTENSION: ICD-10-CM

## 2022-08-23 DIAGNOSIS — I25.82 CHRONIC TOTAL OCCLUSION OF CORONARY ARTERY: Primary | ICD-10-CM

## 2022-08-23 RX ORDER — ASPIRIN 325 MG
325 TABLET ORAL ONCE
Status: CANCELLED | OUTPATIENT
Start: 2022-08-24 | End: 2022-08-23

## 2022-08-23 RX ORDER — LIDOCAINE 40 MG/G
CREAM TOPICAL
Status: CANCELLED | OUTPATIENT
Start: 2022-08-23

## 2022-08-23 RX ORDER — DIAZEPAM 5 MG
5 TABLET ORAL
Status: CANCELLED | OUTPATIENT
Start: 2022-08-24

## 2022-08-23 RX ORDER — FENTANYL CITRATE 50 UG/ML
25 INJECTION, SOLUTION INTRAMUSCULAR; INTRAVENOUS
Status: CANCELLED | OUTPATIENT
Start: 2022-08-24

## 2022-08-23 RX ORDER — ASPIRIN 81 MG/1
243 TABLET, CHEWABLE ORAL ONCE
Status: CANCELLED | OUTPATIENT
Start: 2022-08-24

## 2022-08-23 RX ORDER — SODIUM CHLORIDE 9 MG/ML
INJECTION, SOLUTION INTRAVENOUS CONTINUOUS
Status: CANCELLED | OUTPATIENT
Start: 2022-08-24

## 2022-08-23 NOTE — TELEPHONE ENCOUNTER
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  PCP:  Roshan Sandoval  H&P completed by:  Kera Zuñiga CNP 8/8/22  Admit date 8/24/22 Arrival time:  0630  Anticoagulation:  NA  Previous PCI: Yes  Bypass Grafts: No  Renal Issues: No  Diabetic?: No  Device?: No    Ambulation status: Independently    Reason for Visit:  Telephone call to discuss pre-procedure education in preparation for: Percutaneous Coronary Intervention of Chronic Total Occlusion of the Right Coroanry artery     Procedure Prep:  EKG results obtained, dated: To be completed on day of cath lab procedure  Hemogram results obtained: To be completed on day of cath lab procedure  Basic Metabolic Panel results obtained: To be completed on day of cath lab procedure  Pertinent cardiac test results: Lipids completed 7/25/22  COVID-19 test results obtained: @ home antigen test preformed today 8/23/22.    Patient Education    1. Your arrival time is 6:30 am .  Location is [address] [address] - Main Entrance of the Hospital  2. Please plan on being at the hospital all day.  3. At any time, emergencies and/or urgent cases may come up which could delay the start of your procedure.    COVID Testing Instructions  *Mandatory COVID Testing:   ALL Patients will need to complete a COVID test no sooner than 4 days prior to their procedure (regardless of vaccination status).      To schedule COVID testing Please call [phone]    If you want to complete this at an outside facility please call them to find out if they will have the results within the appropriate time frame and their fax number.  You will need to provide us with that information so we can send the order.    The facility completing the test will need to fax the results to [phone]    If you are running into and issues please call us.      Pre-procedure instructions  Patient instructed to not Eat or Drink after midnight.  Patient instructed to shower the evening before or the morning of the procedure.  Patient instructed to arrange for transportation home following procedure from a responsible family member or friend. No driving for at least 24 hours.  Patient instructed to have a responsible adult with them for 24 hours post-procedure.  Post-procedure follow up process.  Conscious sedation discussed.    Pre-procedure medication instructions.  Continue medications as scheduled, with a small amount of water on the day of the procedure unless indicated. (NO Diabetic Medications or Blood Thinners)  Patient instructed to take 325 mg of Aspirin the night before and morning of procedure: Yes  Other medication: Morning of procedure please HOLD all vitamins, minerals, and supplements. Please TAKE (4) baby aspirin or (1) full size 325 mg aspirin morning of procedure. Ok to take Isosorbide, Pantoprazole and Metoprolol.               Diabetic Medication Instructions  ? DO NOT take any oral diabetic medication, short-acting diabetes medications/insulin, humalog or regular insulin the morning of your test  ? Take   dose of long-acting insulin (Lantus, Levemir) the day of your test  ? Hold Oral Diabetic on the day of the procedure and for 48 hours after IV contrast given  ? Remember to  bring your glucometer and insulin with you to take after your test if needed              Anticoagulation Medication Instructions     N/A    Patient states understanding of procedure and agrees to proceed.    *PATIENTS RECORDS AVAILABLE IN [facility] UNLESS OTHERWISE INDICATED*      Patient Active Problem List   Diagnosis     Diverticulitis     NSTEMI (non-ST elevated myocardial infarction) (H)     CAD (coronary artery disease)     Sinus bradycardia     Benign essential hypertension     Gastroesophageal reflux disease without esophagitis     Hyponatremia     Dyslipidemia, goal LDL  "below 70       Current Outpatient Medications   Medication Sig Dispense Refill     aspirin (ASA) 81 MG chewable tablet Take 1 tablet (81 mg) by mouth daily for 30 days 30 tablet 0     atorvastatin (LIPITOR) 40 MG tablet Take 1 tablet (40 mg) by mouth daily for 30 days 30 tablet 0     cholecalciferol (VITAMIN D3) 125 mcg (5000 units) capsule Take 125 mcg by mouth daily       isosorbide mononitrate (IMDUR) 30 MG 24 hr tablet Take 1 tablet (30 mg) by mouth daily for 30 days DO NOT CRUSH. Can split tablet in half along score therese. 30 tablet 0     lisinopril (PRINIVIL,ZESTRIL) 10 MG tablet **Patient stopped at home due to low blood pressures.       metoprolol succinate ER (TOPROL XL) 25 MG 24 hr tablet Take 1 tablet (25 mg) by mouth daily for 30 days 30 tablet 0     nitroGLYcerin (NITROSTAT) 0.4 MG sublingual tablet One tablet under the tongue every 5 minutes if needed for chest pain. May repeat every 5 minutes for a maximum of 3 doses in 15 minutes\" 25 tablet 3     pantoprazole (PROTONIX) 40 MG tablet [PANTOPRAZOLE (PROTONIX) 40 MG TABLET] Take 40 mg by mouth daily.       vitamin E (TOCOPHEROL) 400 units (180 mg) capsule Take 400 Units by mouth daily       Order Set Entry: 8/23/22    Plan: Patient education completed. Opportunity to ask questions and have them answered. None further at this time. Patient verbalized understanding of responsible adult for 24 hours and  post-procedure at time of discharge. Patient will be accompanied by his wife Beata. Pt ready for procedure.      No Known Allergies    Bg Handy RN on 8/23/2022 at 9:43 AM        "

## 2022-08-23 NOTE — TELEPHONE ENCOUNTER
PC with patient and wife. Review of pre-procedure instructions. E-mail not received, resent, and verified receipt. Education completed. No further questions. Completed at home Covid test, and wife reports negative results from this morning. Documentation. Orders placed. Pt ready for procedure. CAROLINA,Rn

## 2022-08-24 ENCOUNTER — HOSPITAL ENCOUNTER (OUTPATIENT)
Facility: HOSPITAL | Age: 72
Discharge: HOME | End: 2022-08-25
Attending: INTERNAL MEDICINE | Admitting: INTERNAL MEDICINE
Payer: COMMERCIAL

## 2022-08-24 DIAGNOSIS — I21.4 NSTEMI (NON-ST ELEVATED MYOCARDIAL INFARCTION) (H): ICD-10-CM

## 2022-08-24 DIAGNOSIS — I10 BENIGN ESSENTIAL HYPERTENSION: ICD-10-CM

## 2022-08-24 DIAGNOSIS — I25.82 CHRONIC TOTAL OCCLUSION OF CORONARY ARTERY: ICD-10-CM

## 2022-08-24 DIAGNOSIS — I25.118 CORONARY ARTERY DISEASE OF NATIVE ARTERY OF NATIVE HEART WITH STABLE ANGINA PECTORIS (H): ICD-10-CM

## 2022-08-24 LAB
ABO/RH(D): NORMAL
ACT BLD: 152 SECONDS (ref 74–150)
ACT BLD: 190 SECONDS (ref 74–150)
ACT BLD: 203 SECONDS (ref 74–150)
ACT BLD: 233 SECONDS (ref 74–150)
ACT BLD: 280 SECONDS (ref 74–150)
ANION GAP SERPL CALCULATED.3IONS-SCNC: 10 MMOL/L (ref 5–18)
ANTIBODY SCREEN: NEGATIVE
ATRIAL RATE - MUSE: 52 BPM
ATRIAL RATE - MUSE: 55 BPM
BASOPHILS # BLD AUTO: 0 10E3/UL (ref 0–0.2)
BASOPHILS NFR BLD AUTO: 1 %
BUN SERPL-MCNC: 13 MG/DL (ref 8–28)
CALCIUM SERPL-MCNC: 9.3 MG/DL (ref 8.5–10.5)
CHLORIDE BLD-SCNC: 102 MMOL/L (ref 98–107)
CO2 SERPL-SCNC: 27 MMOL/L (ref 22–31)
CREAT SERPL-MCNC: 0.88 MG/DL (ref 0.7–1.3)
DIASTOLIC BLOOD PRESSURE - MUSE: NORMAL MMHG
DIASTOLIC BLOOD PRESSURE - MUSE: NORMAL MMHG
EOSINOPHIL # BLD AUTO: 0.2 10E3/UL (ref 0–0.7)
EOSINOPHIL NFR BLD AUTO: 3 %
ERYTHROCYTE [DISTWIDTH] IN BLOOD BY AUTOMATED COUNT: 12.4 % (ref 10–15)
ERYTHROCYTE [DISTWIDTH] IN BLOOD BY AUTOMATED COUNT: 12.5 % (ref 10–15)
GFR SERPL CREATININE-BSD FRML MDRD: >90 ML/MIN/1.73M2
GLUCOSE BLD-MCNC: 95 MG/DL (ref 70–125)
HCT VFR BLD AUTO: 37.6 % (ref 40–53)
HCT VFR BLD AUTO: 41.7 % (ref 40–53)
HGB BLD-MCNC: 13 G/DL (ref 13.3–17.7)
HGB BLD-MCNC: 14.1 G/DL (ref 13.3–17.7)
IMM GRANULOCYTES # BLD: 0 10E3/UL
IMM GRANULOCYTES NFR BLD: 0 %
INTERPRETATION ECG - MUSE: NORMAL
INTERPRETATION ECG - MUSE: NORMAL
LYMPHOCYTES # BLD AUTO: 1.7 10E3/UL (ref 0.8–5.3)
LYMPHOCYTES NFR BLD AUTO: 29 %
MCH RBC QN AUTO: 30.3 PG (ref 26.5–33)
MCH RBC QN AUTO: 30.7 PG (ref 26.5–33)
MCHC RBC AUTO-ENTMCNC: 33.8 G/DL (ref 31.5–36.5)
MCHC RBC AUTO-ENTMCNC: 34.6 G/DL (ref 31.5–36.5)
MCV RBC AUTO: 89 FL (ref 78–100)
MCV RBC AUTO: 90 FL (ref 78–100)
MONOCYTES # BLD AUTO: 0.7 10E3/UL (ref 0–1.3)
MONOCYTES NFR BLD AUTO: 11 %
NEUTROPHILS # BLD AUTO: 3.5 10E3/UL (ref 1.6–8.3)
NEUTROPHILS NFR BLD AUTO: 56 %
NRBC # BLD AUTO: 0 10E3/UL
NRBC BLD AUTO-RTO: 0 /100
P AXIS - MUSE: 51 DEGREES
P AXIS - MUSE: 57 DEGREES
PLATELET # BLD AUTO: 156 10E3/UL (ref 150–450)
PLATELET # BLD AUTO: 185 10E3/UL (ref 150–450)
POTASSIUM BLD-SCNC: 4.1 MMOL/L (ref 3.5–5)
PR INTERVAL - MUSE: 170 MS
PR INTERVAL - MUSE: 174 MS
QRS DURATION - MUSE: 104 MS
QRS DURATION - MUSE: 106 MS
QT - MUSE: 404 MS
QT - MUSE: 430 MS
QTC - MUSE: 386 MS
QTC - MUSE: 399 MS
R AXIS - MUSE: -34 DEGREES
R AXIS - MUSE: -38 DEGREES
RBC # BLD AUTO: 4.23 10E6/UL (ref 4.4–5.9)
RBC # BLD AUTO: 4.66 10E6/UL (ref 4.4–5.9)
SODIUM SERPL-SCNC: 139 MMOL/L (ref 136–145)
SPECIMEN EXPIRATION DATE: NORMAL
SYSTOLIC BLOOD PRESSURE - MUSE: NORMAL MMHG
SYSTOLIC BLOOD PRESSURE - MUSE: NORMAL MMHG
T AXIS - MUSE: -45 DEGREES
T AXIS - MUSE: -48 DEGREES
VENTRICULAR RATE- MUSE: 52 BPM
VENTRICULAR RATE- MUSE: 55 BPM
WBC # BLD AUTO: 5.4 10E3/UL (ref 4–11)
WBC # BLD AUTO: 6.1 10E3/UL (ref 4–11)

## 2022-08-24 PROCEDURE — 99152 MOD SED SAME PHYS/QHP 5/>YRS: CPT | Performed by: INTERNAL MEDICINE

## 2022-08-24 PROCEDURE — 36415 COLL VENOUS BLD VENIPUNCTURE: CPT | Performed by: INTERNAL MEDICINE

## 2022-08-24 PROCEDURE — 250N000013 HC RX MED GY IP 250 OP 250 PS 637: Performed by: INTERNAL MEDICINE

## 2022-08-24 PROCEDURE — C1874 STENT, COATED/COV W/DEL SYS: HCPCS | Performed by: INTERNAL MEDICINE

## 2022-08-24 PROCEDURE — 250N000011 HC RX IP 250 OP 636: Performed by: INTERNAL MEDICINE

## 2022-08-24 PROCEDURE — C1769 GUIDE WIRE: HCPCS | Performed by: INTERNAL MEDICINE

## 2022-08-24 PROCEDURE — 250N000009 HC RX 250: Performed by: INTERNAL MEDICINE

## 2022-08-24 PROCEDURE — C1760 CLOSURE DEV, VASC: HCPCS | Performed by: INTERNAL MEDICINE

## 2022-08-24 PROCEDURE — 99153 MOD SED SAME PHYS/QHP EA: CPT | Performed by: INTERNAL MEDICINE

## 2022-08-24 PROCEDURE — 85347 COAGULATION TIME ACTIVATED: CPT

## 2022-08-24 PROCEDURE — 36415 COLL VENOUS BLD VENIPUNCTURE: CPT | Performed by: STUDENT IN AN ORGANIZED HEALTH CARE EDUCATION/TRAINING PROGRAM

## 2022-08-24 PROCEDURE — C9600 PERC DRUG-EL COR STENT SING: HCPCS | Performed by: INTERNAL MEDICINE

## 2022-08-24 PROCEDURE — 92928 PRQ TCAT PLMT NTRAC ST 1 LES: CPT | Mod: RC | Performed by: INTERNAL MEDICINE

## 2022-08-24 PROCEDURE — 272N000001 HC OR GENERAL SUPPLY STERILE: Performed by: INTERNAL MEDICINE

## 2022-08-24 PROCEDURE — 93010 ELECTROCARDIOGRAM REPORT: CPT | Performed by: INTERNAL MEDICINE

## 2022-08-24 PROCEDURE — 80048 BASIC METABOLIC PNL TOTAL CA: CPT | Performed by: INTERNAL MEDICINE

## 2022-08-24 PROCEDURE — 85027 COMPLETE CBC AUTOMATED: CPT | Performed by: STUDENT IN AN ORGANIZED HEALTH CARE EDUCATION/TRAINING PROGRAM

## 2022-08-24 PROCEDURE — 258N000003 HC RX IP 258 OP 636: Performed by: INTERNAL MEDICINE

## 2022-08-24 PROCEDURE — C1894 INTRO/SHEATH, NON-LASER: HCPCS | Performed by: INTERNAL MEDICINE

## 2022-08-24 PROCEDURE — C1887 CATHETER, GUIDING: HCPCS | Performed by: INTERNAL MEDICINE

## 2022-08-24 PROCEDURE — 86850 RBC ANTIBODY SCREEN: CPT | Performed by: INTERNAL MEDICINE

## 2022-08-24 PROCEDURE — 93005 ELECTROCARDIOGRAM TRACING: CPT

## 2022-08-24 PROCEDURE — C1725 CATH, TRANSLUMIN NON-LASER: HCPCS | Performed by: INTERNAL MEDICINE

## 2022-08-24 PROCEDURE — 85027 COMPLETE CBC AUTOMATED: CPT | Performed by: INTERNAL MEDICINE

## 2022-08-24 PROCEDURE — 999N000054 HC STATISTIC EKG NON-CHARGEABLE

## 2022-08-24 DEVICE — IMPLANTABLE DEVICE: Type: IMPLANTABLE DEVICE | Status: FUNCTIONAL

## 2022-08-24 RX ORDER — FENTANYL CITRATE 50 UG/ML
25 INJECTION, SOLUTION INTRAMUSCULAR; INTRAVENOUS
Status: DISCONTINUED | OUTPATIENT
Start: 2022-08-24 | End: 2022-08-25 | Stop reason: HOSPADM

## 2022-08-24 RX ORDER — ACETAMINOPHEN 325 MG/1
650 TABLET ORAL EVERY 4 HOURS PRN
Status: DISCONTINUED | OUTPATIENT
Start: 2022-08-24 | End: 2022-08-25 | Stop reason: HOSPADM

## 2022-08-24 RX ORDER — HYDRALAZINE HYDROCHLORIDE 20 MG/ML
10 INJECTION INTRAMUSCULAR; INTRAVENOUS EVERY 4 HOURS PRN
Status: DISCONTINUED | OUTPATIENT
Start: 2022-08-24 | End: 2022-08-25 | Stop reason: HOSPADM

## 2022-08-24 RX ORDER — ASPIRIN 325 MG
325 TABLET ORAL ONCE
Status: DISCONTINUED | OUTPATIENT
Start: 2022-08-24 | End: 2022-08-24 | Stop reason: HOSPADM

## 2022-08-24 RX ORDER — FLUMAZENIL 0.1 MG/ML
0.2 INJECTION, SOLUTION INTRAVENOUS
Status: ACTIVE | OUTPATIENT
Start: 2022-08-24 | End: 2022-08-24

## 2022-08-24 RX ORDER — SODIUM CHLORIDE 9 MG/ML
INJECTION, SOLUTION INTRAVENOUS CONTINUOUS
Status: ACTIVE | OUTPATIENT
Start: 2022-08-24 | End: 2022-08-24

## 2022-08-24 RX ORDER — METOPROLOL SUCCINATE 25 MG/1
25 TABLET, EXTENDED RELEASE ORAL EVERY EVENING
Status: DISCONTINUED | OUTPATIENT
Start: 2022-08-24 | End: 2022-08-25 | Stop reason: HOSPADM

## 2022-08-24 RX ORDER — NALOXONE HYDROCHLORIDE 0.4 MG/ML
0.2 INJECTION, SOLUTION INTRAMUSCULAR; INTRAVENOUS; SUBCUTANEOUS
Status: ACTIVE | OUTPATIENT
Start: 2022-08-24 | End: 2022-08-24

## 2022-08-24 RX ORDER — NITROGLYCERIN 5 MG/ML
VIAL (ML) INTRAVENOUS
Status: DISCONTINUED | OUTPATIENT
Start: 2022-08-24 | End: 2022-08-24 | Stop reason: HOSPADM

## 2022-08-24 RX ORDER — OXYCODONE HYDROCHLORIDE 5 MG/1
10 TABLET ORAL EVERY 4 HOURS PRN
Status: DISCONTINUED | OUTPATIENT
Start: 2022-08-24 | End: 2022-08-25 | Stop reason: HOSPADM

## 2022-08-24 RX ORDER — IOPAMIDOL 755 MG/ML
INJECTION, SOLUTION INTRAVASCULAR
Status: DISCONTINUED | OUTPATIENT
Start: 2022-08-24 | End: 2022-08-24 | Stop reason: HOSPADM

## 2022-08-24 RX ORDER — DIAZEPAM 5 MG
5 TABLET ORAL
Status: COMPLETED | OUTPATIENT
Start: 2022-08-24 | End: 2022-08-24

## 2022-08-24 RX ORDER — ASPIRIN 81 MG/1
81 TABLET ORAL DAILY
Status: DISCONTINUED | OUTPATIENT
Start: 2022-08-25 | End: 2022-08-25 | Stop reason: HOSPADM

## 2022-08-24 RX ORDER — PANTOPRAZOLE SODIUM 40 MG/1
40 TABLET, DELAYED RELEASE ORAL DAILY
Status: DISCONTINUED | OUTPATIENT
Start: 2022-08-25 | End: 2022-08-25 | Stop reason: HOSPADM

## 2022-08-24 RX ORDER — ASPIRIN 81 MG/1
81 TABLET, CHEWABLE ORAL ONCE
Status: DISCONTINUED | OUTPATIENT
Start: 2022-08-24 | End: 2022-08-25 | Stop reason: HOSPADM

## 2022-08-24 RX ORDER — NALOXONE HYDROCHLORIDE 0.4 MG/ML
0.4 INJECTION, SOLUTION INTRAMUSCULAR; INTRAVENOUS; SUBCUTANEOUS
Status: ACTIVE | OUTPATIENT
Start: 2022-08-24 | End: 2022-08-24

## 2022-08-24 RX ORDER — ASPIRIN 81 MG/1
243 TABLET, CHEWABLE ORAL ONCE
Status: DISCONTINUED | OUTPATIENT
Start: 2022-08-24 | End: 2022-08-24 | Stop reason: HOSPADM

## 2022-08-24 RX ORDER — METOPROLOL TARTRATE 1 MG/ML
5 INJECTION, SOLUTION INTRAVENOUS
Status: DISCONTINUED | OUTPATIENT
Start: 2022-08-24 | End: 2022-08-25 | Stop reason: HOSPADM

## 2022-08-24 RX ORDER — ATORVASTATIN CALCIUM 40 MG/1
40 TABLET, FILM COATED ORAL DAILY
Status: DISCONTINUED | OUTPATIENT
Start: 2022-08-25 | End: 2022-08-25 | Stop reason: HOSPADM

## 2022-08-24 RX ORDER — HEPARIN SODIUM 1000 [USP'U]/ML
INJECTION, SOLUTION INTRAVENOUS; SUBCUTANEOUS
Status: DISCONTINUED | OUTPATIENT
Start: 2022-08-24 | End: 2022-08-24 | Stop reason: HOSPADM

## 2022-08-24 RX ORDER — ONDANSETRON 2 MG/ML
4 INJECTION INTRAMUSCULAR; INTRAVENOUS EVERY 6 HOURS PRN
Status: DISCONTINUED | OUTPATIENT
Start: 2022-08-24 | End: 2022-08-25 | Stop reason: HOSPADM

## 2022-08-24 RX ORDER — FENTANYL CITRATE 50 UG/ML
25 INJECTION, SOLUTION INTRAMUSCULAR; INTRAVENOUS
Status: DISCONTINUED | OUTPATIENT
Start: 2022-08-24 | End: 2022-08-24 | Stop reason: HOSPADM

## 2022-08-24 RX ORDER — ONDANSETRON 4 MG/1
4 TABLET, ORALLY DISINTEGRATING ORAL EVERY 6 HOURS PRN
Status: DISCONTINUED | OUTPATIENT
Start: 2022-08-24 | End: 2022-08-25 | Stop reason: HOSPADM

## 2022-08-24 RX ORDER — FENTANYL CITRATE 50 UG/ML
INJECTION, SOLUTION INTRAMUSCULAR; INTRAVENOUS
Status: DISCONTINUED | OUTPATIENT
Start: 2022-08-24 | End: 2022-08-24 | Stop reason: HOSPADM

## 2022-08-24 RX ORDER — ATORVASTATIN CALCIUM 40 MG/1
40 TABLET, FILM COATED ORAL DAILY
Qty: 90 TABLET | Refills: 3 | Status: SHIPPED | OUTPATIENT
Start: 2022-08-24 | End: 2023-06-14

## 2022-08-24 RX ORDER — LIDOCAINE 40 MG/G
CREAM TOPICAL
Status: DISCONTINUED | OUTPATIENT
Start: 2022-08-24 | End: 2022-08-24 | Stop reason: HOSPADM

## 2022-08-24 RX ORDER — LISINOPRIL 5 MG/1
10 TABLET ORAL DAILY
Status: DISCONTINUED | OUTPATIENT
Start: 2022-08-25 | End: 2022-08-25 | Stop reason: HOSPADM

## 2022-08-24 RX ORDER — ATROPINE SULFATE 0.1 MG/ML
0.5 INJECTION INTRAVENOUS
Status: ACTIVE | OUTPATIENT
Start: 2022-08-24 | End: 2022-08-24

## 2022-08-24 RX ORDER — NITROGLYCERIN 0.4 MG/1
0.4 TABLET SUBLINGUAL EVERY 5 MIN PRN
Status: DISCONTINUED | OUTPATIENT
Start: 2022-08-24 | End: 2022-08-25 | Stop reason: HOSPADM

## 2022-08-24 RX ORDER — OXYCODONE HYDROCHLORIDE 5 MG/1
5 TABLET ORAL EVERY 4 HOURS PRN
Status: DISCONTINUED | OUTPATIENT
Start: 2022-08-24 | End: 2022-08-25 | Stop reason: HOSPADM

## 2022-08-24 RX ORDER — SODIUM CHLORIDE 9 MG/ML
INJECTION, SOLUTION INTRAVENOUS CONTINUOUS
Status: DISCONTINUED | OUTPATIENT
Start: 2022-08-24 | End: 2022-08-24 | Stop reason: HOSPADM

## 2022-08-24 RX ADMIN — SODIUM CHLORIDE: 9 INJECTION, SOLUTION INTRAVENOUS at 07:31

## 2022-08-24 RX ADMIN — DIAZEPAM 5 MG: 5 TABLET ORAL at 08:31

## 2022-08-24 RX ADMIN — ACETAMINOPHEN 325 MG: 325 TABLET ORAL at 16:42

## 2022-08-24 ASSESSMENT — ACTIVITIES OF DAILY LIVING (ADL)
ADLS_ACUITY_SCORE: 35

## 2022-08-24 NOTE — Clinical Note
The first balloon was inserted into the right coronary artery and middle right coronary artery.Max pressure = 15 obdulio. Total duration = 28 seconds.

## 2022-08-24 NOTE — PRE-PROCEDURE
GENERAL PRE-PROCEDURE:   Procedure:  RCA  PCI  Date/Time:  8/24/2022 7:05 AM    Written consent obtained?: Yes    Risks and benefits: Risks, benefits and alternatives were discussed    Consent given by:  Patient  Patient states understanding of procedure being performed: Yes    Patient's understanding of procedure matches consent: Yes    Procedure consent matches procedure scheduled: Yes    Expected level of sedation:  Moderate  Appropriately NPO:  Yes  ASA Class:  3 (CAD with recent NSTEMI 7/2022, HTN, Dyslpidemia)  Mallampati  :  Grade 3- soft palate visible, posterior pharyngeal wall not visible  Lungs:  Lungs clear with good breath sounds bilaterally  Heart:  Normal heart sounds and rate  History & Physical reviewed:  History and physical reviewed and no updates needed  Statement of review:  I have reviewed the lab findings, diagnostic data, medications, and the plan for sedation

## 2022-08-24 NOTE — PROGRESS NOTES
Admitted to [facility] for  of RCA with Dr Haro. Pt med with Roxie AZAR CNP. Pt states a good understanding of the procedure and agrees to proceed. Prepped and ready.

## 2022-08-24 NOTE — Clinical Note
Stent deployed in the middle right coronary artery. Max pressure = 15 obdulio. Total duration = 30 seconds.

## 2022-08-24 NOTE — Clinical Note
8 Fr Angio-Seal utilized for closure of site.  Manual pressure applied by scrub person; hemostasis achieved.

## 2022-08-24 NOTE — INTERVAL H&P NOTE
"I have reviewed the surgical (or preoperative) H&P that is linked to this encounter, and examined the patient. There are no significant changes    Clinical Conditions Present on Arrival:  Clinically Significant Risk Factors Present on Admission                   # Overweight: Estimated body mass index is 26.66 kg/m  as calculated from the following:    Height as of this encounter: 1.93 m (6' 4\").    Weight as of this encounter: 99.3 kg (219 lb).     Late entry    "

## 2022-08-25 VITALS
DIASTOLIC BLOOD PRESSURE: 75 MMHG | SYSTOLIC BLOOD PRESSURE: 126 MMHG | WEIGHT: 216 LBS | BODY MASS INDEX: 26.3 KG/M2 | OXYGEN SATURATION: 95 % | HEART RATE: 60 BPM | HEIGHT: 76 IN | TEMPERATURE: 98.2 F | RESPIRATION RATE: 16 BRPM

## 2022-08-25 LAB — HOLD SPECIMEN: NORMAL

## 2022-08-25 PROCEDURE — 99213 OFFICE O/P EST LOW 20 MIN: CPT | Performed by: NURSE PRACTITIONER

## 2022-08-25 PROCEDURE — 250N000013 HC RX MED GY IP 250 OP 250 PS 637: Performed by: INTERNAL MEDICINE

## 2022-08-25 PROCEDURE — 93005 ELECTROCARDIOGRAM TRACING: CPT | Performed by: INTERNAL MEDICINE

## 2022-08-25 RX ADMIN — LISINOPRIL 10 MG: 5 TABLET ORAL at 08:54

## 2022-08-25 RX ADMIN — PANTOPRAZOLE SODIUM 40 MG: 40 TABLET, DELAYED RELEASE ORAL at 08:54

## 2022-08-25 RX ADMIN — TICAGRELOR 90 MG: 90 TABLET ORAL at 00:05

## 2022-08-25 RX ADMIN — Medication 400 UNITS: at 08:54

## 2022-08-25 RX ADMIN — ATORVASTATIN CALCIUM 40 MG: 40 TABLET, FILM COATED ORAL at 08:54

## 2022-08-25 RX ADMIN — TICAGRELOR 90 MG: 90 TABLET ORAL at 08:54

## 2022-08-25 RX ADMIN — Medication 125 MCG: at 08:54

## 2022-08-25 RX ADMIN — METOPROLOL SUCCINATE 25 MG: 25 TABLET, EXTENDED RELEASE ORAL at 00:06

## 2022-08-25 RX ADMIN — Medication 81 MG: at 08:54

## 2022-08-25 ASSESSMENT — ACTIVITIES OF DAILY LIVING (ADL)
ADLS_ACUITY_SCORE: 31

## 2022-08-25 NOTE — PROGRESS NOTES
"    [facility]  [phone]    Assessment/Recommendations   ASSESSMENT:    Coronary artery disease with hx of NSTEMI- s/p RCA  PCI; POD #1    HTN- controlled/stable    HLD- on statin therapy    Sinus bradycardia- stable heart rates on telemetry; 50s-70s, no evidence of AVB    PLAN:    OK to discharge from a cardiology standpoint    Activity restrictions and reportable s/s were discussed with the patient who verbalizes understanding and denies needs at time of discharge    Continue DAPT with Brilinta and ASA    Continue statin therapy    OK to discontinue Imdur    Cardiology follow up is arranged on 9/9/2022 at 0910 with Kera Zuñiga CNP at [facility]    Thank you for the opportunity to participate in the care of this patient       History of Present Illness/Subjective    HPI: Dilip Frye is a 72 year old male history of CAD with recent NSTEMI 7/2022, RCA , HTN, dyslipidemia, and sinus bradycardia who underwent successful RCA  PCI with NORMA x1. The patient developed oozing and subsequent right groin hematoma post procedure which necessitated overnight observation. Hematoma has since resolved and is stable prior to discharge.     Coronary Findings      Diagnostic  Dominance: Right    Right Coronary Artery   Mid RCA lesion is 95% stenosed. The lesion is type C - high risk.       Intervention      Mid RCA lesion   Stent   Lesion length: 19 mm. The pre-interventional distal flow is decreased (ANGELITO 2). The post-interventional distal flow is normal (ANGELITO 3). The intervention was successful.   Supplies used: STENT CORONARY NORMA SYNERGY XD MR US 4.71I69SH [id number]; CATH BALLOON EMERGE 3.5X12MM [id number]   There is a 0% residual stenosis post intervention.          Physical Examination  Review of Systems   Vitals: /75 (BP Location: Left arm)   Pulse 60   Temp 98.2  F (36.8  C) (Oral)   Resp 16   Ht 1.93 m (6' 4\")   Wt 98 kg (216 lb)   SpO2 95%   " BMI 26.29 kg/m    BMI= Body mass index is 26.29 kg/m .  Wt Readings from Last 3 Encounters:   08/25/22 98 kg (216 lb)   08/02/22 100.2 kg (221 lb)   07/27/22 98.8 kg (217 lb 12.8 oz)       General Appearance:   no distress, normal body habitus   ENT/Mouth: membranes moist, no oral lesions or bleeding gums.      EYES:  no scleral icterus, normal conjunctivae   Neck: no carotid bruits or thyromegaly   Chest/Lungs:   lungs are clear to auscultation, no rales or wheezing, equal chest wall expansion    Cardiovascular:   Regular. Normal first and second heart sounds with no murmurs, rubs, or gallops; the carotid, radial and posterior tibial pulses are intact, Jugular venous pressure not elevated, no edema   Abdomen:  no organomegaly, masses, bruits, or tenderness; bowel sounds are present   Extremities: no cyanosis or clubbing. Right femoral vascular access site with mild ecchymosis. No hematoma. No s/s of infection. Left femoral vascular access site is WNL.    Skin: no xanthelasma, warm.    Neurologic: normal  bilateral, no tremors     Psychiatric: alert and oriented x3, calm     Denies chest pain or shortness of breath. Ambulating without difficulty. Rt groin stable       Medical History  Surgical History Family History Social History   Past Medical History:   Diagnosis Date     Benign essential hypertension      Gastroesophageal reflux disease with esophagitis      Past Surgical History:   Procedure Laterality Date     CERVICAL LAMINECTOMY       CV CORONARY ANGIOGRAM N/A 7/25/2022    Procedure: CV CORONARY ANGIOGRAM;  Surgeon: Quentin Miranda MD;  Location: [facility] CV     CV LEFT HEART CATH N/A 7/25/2022    Procedure: Left Heart Catheterization;  Surgeon: Quentin Miranda MD;  Location: [facility] CV     CV LEFT VENTRICULOGRAM N/A 7/25/2022    Procedure: Left Ventriculogram;  Surgeon: Quentin Miranda MD;  Location: [facility] CV     CV PCI ANGIOPLASTY N/A 8/24/2022    Procedure:  Percutaneous Transluminal Angioplasty;  Surgeon: Jude Haro MD;  Location: [facility] CV     CV PCI STENT DRUG ELUTING N/A 8/24/2022    Procedure: Percutaneous Coronary Intervention Stent;  Surgeon: Jude Haro MD;  Location: [facility] LAB CV     Family History   Problem Relation Age of Onset     Brain Cancer Mother      No Known Problems Father      Coronary Artery Disease Early Onset No family hx of         Social History     Socioeconomic History     Marital status:      Spouse name: Not on file     Number of children: Not on file     Years of education: Not on file     Highest education level: Not on file   Occupational History     Not on file   Tobacco Use     Smoking status: Never Smoker     Smokeless tobacco: Never Used   Substance and Sexual Activity     Alcohol use: Yes     Alcohol/week: 1.0 standard drink     Comment: Alcoholic Drinks/day: One drink twice a month per pt     Drug use: No     Sexual activity: Not on file   Other Topics Concern     Not on file   Social History Narrative    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     Social Determinants of Health     Financial Resource Strain: Not on file   Food Insecurity: Not on file   Transportation Needs: Not on file   Physical Activity: Not on file   Stress: Not on file   Social Connections: Not on file   Intimate Partner Violence: Not on file   Housing Stability: Not on file           Medications  Allergies   Current Outpatient Medications   Medication Sig Dispense Refill     aspirin (ASA) 81 MG EC tablet Take 1 tablet (81 mg) by mouth daily Start tomorrow. 30 tablet 3     atorvastatin (LIPITOR) 40 MG tablet Take 1 tablet (40 mg) by mouth daily 90 tablet 3     ticagrelor (BRILINTA) 90 MG tablet Take 1 tablet (90 mg) by mouth 2 times daily Start this evening. 180 tablet 3     No Known Allergies       Lab Results    Chemistry/lipid CBC Cardiac Enzymes/BNP/TSH/INR   Recent Labs   Lab Test 07/25/22  0356   CHOL  184   HDL 31*      TRIG 171*     Recent Labs   Lab Test 07/25/22  0356 01/30/20  0955 02/27/15  1009    137* 156*     Recent Labs   Lab Test 08/24/22  0706      POTASSIUM 4.1   CHLORIDE 102   CO2 27   GLC 95   BUN 13   CR 0.88   GFRESTIMATED >90   TRAE 9.3     Recent Labs   Lab Test 08/24/22  0706 07/26/22  0430 07/25/22  0356   CR 0.88 0.88 0.79     No results for input(s): A1C in the last 51807 hours.       Recent Labs   Lab Test 08/24/22  2250   WBC 6.1   HGB 13.0*   HCT 37.6*   MCV 89        Recent Labs   Lab Test 08/24/22  2250 08/24/22  0706 07/27/22  0450   HGB 13.0* 14.1 12.9*    Recent Labs   Lab Test 07/25/22  0755 07/25/22  0132 07/24/22  2144   TROPONINI 7.53* 6.24* 3.77*     Recent Labs   Lab Test 07/24/22  1657   BNP 24     Recent Labs   Lab Test 07/25/22  0356   TSH 2.04     Recent Labs   Lab Test 07/24/22  1657   INR 1.01        Aydee Flannery, CNP

## 2022-08-25 NOTE — PHARMACY-ADMISSION MEDICATION HISTORY
Admission medication history completed at [facility]. Please see Pharmacy - Progress note from 08/24/2022.

## 2022-08-25 NOTE — PROGRESS NOTES
Report called to Telemetry.  Paging Dr Haro since rounding cardiologist has not returned our page.

## 2022-08-25 NOTE — PLAN OF CARE
"  Problem: Plan of Care - These are the overarching goals to be used throughout the patient stay.    Goal: Plan of Care Review/Shift Note  Description: The Plan of Care Review/Shift note should be completed every shift.  The Outcome Evaluation is a brief statement about your assessment that the patient is improving, declining, or no change.  This information will be displayed automatically on your shift note.  Outcome: Met  Goal: Patient-Specific Goal (Individualized)  Description: You can add care plan individualizations to a care plan. Examples of Individualization might be:  \"Parent requests to be called daily at 9am for status\", \"I have a hard time hearing out of my right ear\", or \"Do not touch me to wake me up as it startles me\".  Outcome: Met  Goal: Absence of Hospital-Acquired Illness or Injury  Outcome: Met  Intervention: Prevent and Manage VTE (Venous Thromboembolism) Risk  Recent Flowsheet Documentation  Taken 8/25/2022 1102 by Rosibel Portillo RN  Activity Management: ambulated in musa  Goal: Optimal Comfort and Wellbeing  Outcome: Met  Goal: Readiness for Transition of Care  Outcome: Met     Problem: Arrhythmia/Dysrhythmia (Cardiac Catheterization)  Goal: Stable Heart Rate and Rhythm  Outcome: Met     Problem: Bleeding (Cardiac Catheterization)  Goal: Absence of Bleeding  Outcome: Met     Problem: Contrast-Induced Injury Risk (Cardiac Catheterization)  Goal: Absence of Contrast-Induced Injury  Outcome: Met     Problem: Embolism (Cardiac Catheterization)  Goal: Absence of Embolism Signs and Symptoms  Outcome: Met     Problem: Ongoing Anesthesia/Sedation Effects (Cardiac Catheterization)  Goal: Anesthesia/Sedation Recovery  Outcome: Met     Problem: Pain (Cardiac Catheterization)  Goal: Acceptable Pain Control  Outcome: Met     Problem: Vascular Access Protection (Cardiac Catheterization)  Goal: Absence of Vascular Access Complication  Outcome: Met  Intervention: Prevent Access Site " Complications  Recent Flowsheet Documentation  Taken 8/25/2022 1102 by Rosibel Portillo, RN  Activity Management: ambulated in musa   Goal Outcome Evaluation:        VS stable. He denies any pain and discofort.  Angio site was dry and  intact, no hematoma and bleedig noted. He ambulated in hallway and he denies any pain and discomfort, he tolarated well. Pt discharged to home and  discharged paper and instruction done.He denies any question and concerns about medication and discharge instruction. Brilinta medication send with him and all belongs send with him. He transported by w/c.

## 2022-08-25 NOTE — PLAN OF CARE
Problem: Bleeding (Cardiac Catheterization)  Goal: Absence of Bleeding  Outcome: Ongoing, Progressing     Problem: Arrhythmia/Dysrhythmia (Cardiac Catheterization)  Goal: Stable Heart Rate and Rhythm  Outcome: Ongoing, Progressing     Problem: Pain (Cardiac Catheterization)  Goal: Acceptable Pain Control  Outcome: Ongoing, Progressing  Intervention: Prevent or Manage Pain  Recent Flowsheet Documentation  Taken 8/25/2022 0343 by Bethanie Mott, RN  Pain Management Interventions: declines  Taken 8/25/2022 0000 by Bethanie Mott, RN  Pain Management Interventions: declines  Taken 8/24/2022 2224 by Bethanie Mott, RN  Pain Management Interventions: declines     Goal Outcome Evaluation:  VSS rhythm NSR and sinus lorenzo.  Pt. Up and ambulated in the room at 0000 with no signs of hematoma or bleeding.  Pt. Asked to try again in an hours as well to be sure. Pt. Up and ambulated in room with out signs or symptoms of hematoma or bleeding. Pt. Continued to have no changes to access sites through out the night.

## 2022-08-25 NOTE — DISCHARGE INSTRUCTIONS
Brilinta at [facility] [address] at [facility].  Left message with Heart care  to set up Nurse Practitioner in 7-10 days.    Interventional Cardiology  Coronary Angiogram/Angioplasty/Stent/Atherectomy Discharge Instructions -   Femoral Approach    The instructions below are to help you understand how to take care of yourself. There is also information about when to call the doctor or emergency services    **Do not stop your aspirin or platelet inhibitor unless directed by your Cardiologist.  These medications help to prevent platelets in your blood from sticking together and forming a clot.  Examples of these medications are:  Ticagrelor (Brilinta), Clopidigrel (Plavix), Prasugrel (Effient)          For the first 72 hours after your procedure:  No driving for 24 hours  Do not lift more than 15 pounds.  If you usually lift 50 pounds or more daily, please contact your cardiologist  Avoid any hard work or tiring activities, this includes, yard work, jogging, biking, sexual activity  During the day get up and walk around every 2 hours  You may return to work after 72 hours if you are feeling well and your job does not involve heavy lifting          Groin Site / Wound Care / Bleeding    You may take off the dressing on your groin the day after your procedure  Keep the area dry and clean  It is ok to shower with regular soap.  Pat dry, do not rub  You do not need to use a bandage  No tub/pool or hot tub for 1 week  If your groin starts to bleed or begins to swell suddenly after leaving the hospital, lie flat and apply firm pressure above the site for 15 minutes.  If bleeding continues, call 9-1-1  Bruising around the groin area is normal.  It may take 2-3 weeks for this to go away.  It is normal for the bruised area to turn green and/or yellow as it is healing.  A small lump may also be present and may last 2-3 months.  Your leg may be sore or stiff for a few days.  You may take Tylenol or a pain medicine  recommended by your doctor  If you have a fever over 100.4, that lasts more than one day - call your cardiologist.      Our Cardiac Rehab staff may visit briefly with you while your in the hospital.  If they miss you, someone will contact you after you are home.  You are encouraged to enroll in an Outpatient Cardiac Rehab Program     No elective dental work for 6 weeks after having a stent.     No driving for 24 hours      Your Procedural Physician was: Dr. Jude Haro  the phone number is: [phone]      [facility]:  [phone]  If you are calling after hours, please listen to the entire voicemail, a live  will answer at the end of the message   * Recovery After Conscious Sedation (Adult)  We gave you medicine by vein to make you sleepy or relaxed during your procedure. This may have included both a pain medicine and sleeping medicine. Most of the effects have worn off. But you may still feel sleepy for the next 6 to 8 hours.  Home care  Follow these guidelines when you get home:  You may feel sleepy and clumsy and have poor balance for the next few hours.  A responsible adult should stay with you for the next 8 hours. This person should make sure your condition doesn t get worse.  Don't drink any alcohol for the next 24 hours.  Don't drive, operate dangerous machinery, make important business or personal decisions or sign legal documents during the next 24 hours.  You may vomit (throw up) if you eat too soon after the procedure. If this happens, drink small amounts of water, juice or clear broth. Wait to try solid food until you no longer have nausea (upset stomach).  Note: Your care team may tell you not to take any medicine by mouth for pain or sleep in the next 4 hours. These medicines may react with the medicines you had in the hospital. This could cause a much stronger response than usual.  Follow-up care  Follow up with your care team if you are not alert and back  to your usual level of activity within 12 hours.  When to seek medical advice  Call your care team right away if any of these occur:  You still feel sleepy or clumsy after 12 hours, or your sleepiness gets worse  Weakness or dizziness gets worse  Repeated vomiting  If you can't be woken up and someone is staying with you, they should call 911.  Date Last Reviewed: 10/18/2016    9561-9160 [employer]. [address] [place] [address], [place] [address]. All rights reserved. This information is not intended as a substitute for professional medical care. Always follow your healthcare professional's instructions.  This information has been modified by your health care provider with permission from the publisher.

## 2022-08-25 NOTE — PROGRESS NOTES
Ambulated at 1930, hematoma formed post.  Manual pressure.  Attempted again at 2100, patient experienced same tenderness.  Brought back to room.  Hematoma again right groin.  Manual pressure held.  Paged on call MD.

## 2022-08-25 NOTE — PROGRESS NOTES
"Pharmacy Note - Admission Medication History    Pertinent Provider Information: confirmed medication list as reconciled by RN this morning with patient. Patient & wife remembered that metoprolol is taken at night     ______________________________________________________________________    Prior To Admission (PTA) med list completed and updated in EMR.       PTA Med List   Medication Sig Last Dose     [START ON 8/25/2022] aspirin (ASA) 81 MG EC tablet Take 1 tablet (81 mg) by mouth daily Start tomorrow.      atorvastatin (LIPITOR) 40 MG tablet Take 1 tablet (40 mg) by mouth daily      cholecalciferol (VITAMIN D3) 125 mcg (5000 units) capsule Take 125 mcg by mouth daily 8/23/2022 at Unknown time     metoprolol succinate ER (TOPROL XL) 25 MG 24 hr tablet Take 1 tablet (25 mg) by mouth daily for 30 days 8/23/2022 at pm     nitroGLYcerin (NITROSTAT) 0.4 MG sublingual tablet One tablet under the tongue every 5 minutes if needed for chest pain. May repeat every 5 minutes for a maximum of 3 doses in 15 minutes\"      pantoprazole (PROTONIX) 40 MG tablet [PANTOPRAZOLE (PROTONIX) 40 MG TABLET] Take 40 mg by mouth daily. 8/24/2022 at Unknown time     ticagrelor (BRILINTA) 90 MG tablet Take 1 tablet (90 mg) by mouth 2 times daily Start this evening.      vitamin E (TOCOPHEROL) 400 units (180 mg) capsule Take 400 Units by mouth daily 8/23/2022 at Unknown time       Information source(s): Patient, Family member and Clinic records  Method of interview communication: in-person    Patient was asked about OTC/herbal products specifically.  PTA med list reflects this.    In the past week, patient estimated taking medication this percent of the time:  greater than 90%.    Allergies were reviewed, assessed, and updated with the patient.      Patient does not use any multi-dose medications prior to admission.    The information provided in this note is only as accurate as the sources available at the time of the update(s).    Thank you " for the opportunity to participate in the care of this patient.    Charlotte Champion [facility]  8/24/2022 10:20 PM

## 2022-08-26 ENCOUNTER — TELEPHONE (OUTPATIENT)
Dept: CARDIOLOGY | Facility: CLINIC | Age: 72
End: 2022-08-26

## 2022-08-26 DIAGNOSIS — I21.4 NSTEMI (NON-ST ELEVATED MYOCARDIAL INFARCTION) (H): ICD-10-CM

## 2022-08-26 RX ORDER — METOPROLOL SUCCINATE 25 MG/1
25 TABLET, EXTENDED RELEASE ORAL DAILY
Qty: 90 TABLET | Refills: 3 | Status: SHIPPED | OUTPATIENT
Start: 2022-08-26 | End: 2023-06-14

## 2022-08-26 NOTE — TELEPHONE ENCOUNTER
[facility] Call Center    Phone Message    May a detailed message be left on voicemail: yes     Reason for Call: Other: Pt did not recieve a refill for metoprolol 25 mg and would like his order sent over to [facility]     Action Taken: Message routed to:  [facility] ([facility]): Cardio    Travel Screening: Not Applicable

## 2022-08-31 ENCOUNTER — HOSPITAL ENCOUNTER (OUTPATIENT)
Dept: CARDIAC REHAB | Facility: HOSPITAL | Age: 72
Discharge: HOME | End: 2022-08-31
Attending: INTERNAL MEDICINE
Payer: COMMERCIAL

## 2022-08-31 DIAGNOSIS — I21.4 NSTEMI (NON-ST ELEVATED MYOCARDIAL INFARCTION) (H): ICD-10-CM

## 2022-08-31 DIAGNOSIS — I25.118 CORONARY ARTERY DISEASE OF NATIVE ARTERY OF NATIVE HEART WITH STABLE ANGINA PECTORIS (H): ICD-10-CM

## 2022-08-31 PROCEDURE — 93798 PHYS/QHP OP CAR RHAB W/ECG: CPT

## 2022-08-31 PROCEDURE — 93797 PHYS/QHP OP CAR RHAB WO ECG: CPT | Mod: 59

## 2022-09-07 NOTE — PROGRESS NOTES
Assessment/Recommendations   Assessment/Plan:  1.  Coronary artery disease: Patient was hospitalized from July 24 through July 27 with chest pain secondary to non-STEMI.  Coronary angiogram on 7/25/2022 showed 100% chronic occlusion in the right coronary artery.  Echocardiogram showed mild hypokinesis in inferolateral wall.  Cardiac MRI showed high probability of viability in the territory of RCA.     She underwent successful staged PCI of RCA  on 8/24/2022.    On dual antiplatelet therapy with aspirin 80 mg indefinitely and Brilinta 90 mg twice a day for 12 months..             2.  Dyslipidemia: Dilip Frye is on high intensity statin therapy with atorvastatin 40 mg daily. His most recent LDL is 119 and triglycerides 177 in July 2022.  Most recent AST/ALT are stable.    3.  Hypertension: Blood pressures well controlled.     on lisinopril 10 mg daily, metoprolol succinate 25 mg daily.    Plan:  -Continue dual antiplatelet therapy for 1 year and then stop Brilinta and continue on aspirin 81 mg indefinitely.  -Continue statin therapy  -Fasting lipid profile and AST and ALT check in the next 1 week.  -Provided education material/information on coronary disease and heart healthy diet.    Follow-up with Dr. Antony as scheduled     History of Present Illness/Subjective    Dilip Frye is a 72 year old years old with recent diagnosis of coronary artery disease with known chronic total occlusion of RCA, hypertension, dyslipidemia, sinus bradycardia, GERD, and hyponatremia who is seen at [facility] for post coronary intervention follow-up.    Today, patient is here accompanied by his wife. He denies fatigue, lightheadedness, shortness of breath, dyspnea on exertion, orthopnea, PND, palpitations, chest pain, abdominal fullness/bloating and lower extremity edema.  He denies any bleeding complications.  His right groin puncture was sore in the beginning and has now  improved.    Coronary angiogram done on 8/24/2022-reviewed  Conclusion    72-year-old male with recent presentation with ST elevation myocardial infarction, and subsequent coronary angiography showing a chronic occlusion of the mid RCA with collaterals to the PDA and BERNADETTE.  He was started on appropriate medical management and referred for PCI of his RCA .     Coronary angiography today showed partial recanalization of the  with residual value 95% stenosis.  This was successfully revascularized with PTCA followed by deployment of a 4.5 x 24 mm Synergy drug-eluting stent, resulting in 0% residual and ANGELITO-3 flow post.     The left coronary artery was not selectively engaged but injected and is known to have mild atherosclerosis from his previous angiogram.        Recommendations    Dual antiplatelet therapy with aspirin and ticagrelor for 1 year followed by aspirin indefinitely  Ongoing risk factor modification     Cardiac MRI on 7/26/2022-reviewed  SUMMARY  1.  Left ventricular size is at the upper limits of normal. Wall thickness is normal. Systolic function is  mildly reduced with inferior and inferolateral wall hypokinesis. The quantified left ventricular ejection  fraction is 49%.   2.  Moderate-sized area of myocardial edema and decreased resting perfusion in the [id number] inferior  and inferolateral wall consistent with acute-to-subacute myocardial infarction in the territory of the  right coronary artery.  3.  Minimal subendocardial scar extending to 1-25% the thickness of the basal inferior and inferolateral  walls suggesting a high probability of viability in the the territory of the right coronary artery.   4.  Right ventricular size is mildly enlarged. Systolic function is mildly reduced.  The quantified right  ventricular ejection fraction is 45%.   5.  Mild left atrial enlargement.  6.  No significant valvular abnormalities.    Echocardiogram on 7/24/2022-reviewed  Interpretation Summary     1.  "Left ventricular size and wall thickness are normal. There is mild  inferolateral wall hypokinesis although overall systolic function is normal.  The calculated left ventricular ejection fraction is 57%.  2. Right ventricular size and systolic function are normal.  3. No hemodynamically significant valvular abnormalities.  4. No prior study available for comparison.    Coronary Angiogram done on 7/25/2022-reviewed:  Conclusion    Left ventricular filling pressures are normal.    Prox LAD to Mid LAD lesion is 20% stenosed.    Prox RCA to Mid RCA lesion is 100% stenosed.    Global LV function mild reduction with akintis inferobase   Plan    Follow bedrest per protocol    Continued medical management and lifestyle modifications for cardiovascular risk factor optimizations.    Arterial sheath removed from radial artery with TR band placement.  Patient with total occlusion of late proximal RCA.  RCA appears to be a large-caliber vessel.  LV suggests akinesis of the inferior base.  Considerations may be to evaluate for extent of viability of the madelyn-infarct region.  If sizable viable tissue remains could be candidate for  PCI.  For now maximize medical therapy beta-blockers, nitrates, and risk factor reduction.  If minimal viable tissue is present and patient asymptomatic with defer revascularization considerations     Recommendations  General Recommendations:  - Patient given specific instructions regarding care of arteriotomy site, activity restrictions, signs and symptoms of cardiac or vascular complications and to seek immediate medical evaluation should they occur.   - Arterial sheath removed from radial artery with TR Band placement.        Physical Examination Review of Systems   /77 (BP Location: Left arm, Patient Position: Sitting, Cuff Size: Adult Regular)   Pulse 65   Resp 16   Ht 1.93 m (6' 4\")   Wt 99.5 kg (219 lb 4.8 oz)   BMI 26.69 kg/m    Body mass index is 26.69 kg/m .  Wt Readings from " Last 3 Encounters:   09/09/22 99.5 kg (219 lb 4.8 oz)   08/25/22 98 kg (216 lb)   08/02/22 100.2 kg (221 lb)     General Appearance:   no distress, normal body habitus   ENT/Mouth: membranes moist, no oral lesions or bleeding gums.      EYES:  no scleral icterus, normal conjunctivae   Neck: no carotid bruits or thyromegaly   Chest/Lungs:   lungs are clear to auscultation, no rales or wheezing, equal chest wall expansion    Cardiovascular:   Heart rate regular. Normal first and second heart sounds with no murmurs, rubs, or gallops; the carotid, radial and posterior tibial pulses are intact, Jugular venous pressure flat with no edema bilaterally, bilateral groin puncture site intact except noted firmness on right groin but soft and intact nontender.  Mild bruising but no hematoma noted.   Abdomen:  no organomegaly, masses, bruits, or tenderness; bowel sounds are present   Extremities   no cyanosis or clubbing. Right radial puncture site intact. Radial pulses and Pedal pulses intact and symmetrical.  CMS intact.   Skin: no xanthelasma, warm.    Neurologic: normal  bilateral, no tremors   Psychiatric: alert and oriented x3, calm             Negative unless noted in HPI     Medical History  Surgical History Family History Social History   Past Medical History:   Diagnosis Date     Benign essential hypertension      Gastroesophageal reflux disease with esophagitis     Past Surgical History:   Procedure Laterality Date     CERVICAL LAMINECTOMY       CV CORONARY ANGIOGRAM N/A 7/25/2022    Procedure: CV CORONARY ANGIOGRAM;  Surgeon: Quentin Miranda MD;  Location: [facility] CV     CV LEFT HEART CATH N/A 7/25/2022    Procedure: Left Heart Catheterization;  Surgeon: Quentin Miranda MD;  Location: [facility] CV     CV LEFT VENTRICULOGRAM N/A 7/25/2022    Procedure: Left Ventriculogram;  Surgeon: Quentin Miranda MD;  Location: [facility] CV     CV PCI ANGIOPLASTY N/A 8/24/2022    Procedure:  Percutaneous Transluminal Angioplasty;  Surgeon: Jude Haro MD;  Location: [facility] CV     CV PCI STENT DRUG ELUTING N/A 8/24/2022    Procedure: Percutaneous Coronary Intervention Stent;  Surgeon: Jude Haro MD;  Location: [facility] LAB CV    Family History   Problem Relation Age of Onset     Brain Cancer Mother      No Known Problems Father      Coronary Artery Disease Early Onset No family hx of     Social History     Socioeconomic History     Marital status:      Spouse name: Not on file     Number of children: Not on file     Years of education: Not on file     Highest education level: Not on file   Occupational History     Not on file   Tobacco Use     Smoking status: Never Smoker     Smokeless tobacco: Never Used   Substance and Sexual Activity     Alcohol use: Yes     Alcohol/week: 1.0 standard drink     Comment: Alcoholic Drinks/day: One drink twice a month per pt     Drug use: No     Sexual activity: Not on file   Other Topics Concern     Not on file   Social History Narrative    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     Social Determinants of Health     Financial Resource Strain: Not on file   Food Insecurity: Not on file   Transportation Needs: Not on file   Physical Activity: Not on file   Stress: Not on file   Social Connections: Not on file   Intimate Partner Violence: Not on file   Housing Stability: Not on file          Medications  Allergies   Current Outpatient Medications   Medication Sig Dispense Refill     aspirin (ASA) 81 MG EC tablet Take 1 tablet (81 mg) by mouth daily Start tomorrow. 30 tablet 3     atorvastatin (LIPITOR) 40 MG tablet Take 1 tablet (40 mg) by mouth daily 90 tablet 3     cholecalciferol (VITAMIN D3) 125 mcg (5000 units) capsule Take 125 mcg by mouth daily       lisinopril (PRINIVIL,ZESTRIL) 10 MG tablet [LISINOPRIL (PRINIVIL,ZESTRIL) 10 MG TABLET] Take 10 mg by mouth daily.       metoprolol succinate ER (TOPROL XL) 25 MG 24  "hr tablet Take 1 tablet (25 mg) by mouth daily 90 tablet 3     nitroGLYcerin (NITROSTAT) 0.4 MG sublingual tablet One tablet under the tongue every 5 minutes if needed for chest pain. May repeat every 5 minutes for a maximum of 3 doses in 15 minutes\" 25 tablet 3     pantoprazole (PROTONIX) 40 MG tablet [PANTOPRAZOLE (PROTONIX) 40 MG TABLET] Take 40 mg by mouth daily.       ticagrelor (BRILINTA) 90 MG tablet Take 1 tablet (90 mg) by mouth 2 times daily Start this evening. 180 tablet 3     vitamin E (TOCOPHEROL) 400 units (180 mg) capsule Take 400 Units by mouth daily      No Known Allergies      Lab Results    Chemistry/lipid CBC Cardiac Enzymes/BNP/TSH/INR   Lab Results   Component Value Date    CHOL 184 07/25/2022    HDL 31 (L) 07/25/2022    TRIG 171 (H) 07/25/2022    BUN 13 08/24/2022     08/24/2022    CO2 27 08/24/2022    Lab Results   Component Value Date    WBC 6.1 08/24/2022    HGB 13.0 (L) 08/24/2022    HCT 37.6 (L) 08/24/2022    MCV 89 08/24/2022     08/24/2022    Lab Results   Component Value Date    TROPONINI 7.53 (HH) 07/25/2022    BNP 24 07/24/2022    TSH 2.04 07/25/2022    INR 1.01 07/24/2022        30  minutes spent on the date of encounter doing chart review, review of test results, interpretation with above tests, patient visit, documentation and discussion with family.        This note has been dictated using voice recognition software. Any grammatical, typographical, or context distortions are unintentional and inherent to the software                                 "

## 2022-09-09 ENCOUNTER — OFFICE VISIT (OUTPATIENT)
Dept: CARDIOLOGY | Facility: CLINIC | Age: 72
End: 2022-09-09
Payer: COMMERCIAL

## 2022-09-09 VITALS
RESPIRATION RATE: 16 BRPM | DIASTOLIC BLOOD PRESSURE: 77 MMHG | HEIGHT: 76 IN | WEIGHT: 219.3 LBS | BODY MASS INDEX: 26.7 KG/M2 | SYSTOLIC BLOOD PRESSURE: 116 MMHG | HEART RATE: 65 BPM

## 2022-09-09 DIAGNOSIS — I10 BENIGN ESSENTIAL HYPERTENSION: ICD-10-CM

## 2022-09-09 DIAGNOSIS — I21.4 NSTEMI (NON-ST ELEVATED MYOCARDIAL INFARCTION) (H): Primary | ICD-10-CM

## 2022-09-09 DIAGNOSIS — E78.5 DYSLIPIDEMIA, GOAL LDL BELOW 70: ICD-10-CM

## 2022-09-09 DIAGNOSIS — I25.118 CORONARY ARTERY DISEASE OF NATIVE ARTERY OF NATIVE HEART WITH STABLE ANGINA PECTORIS (H): ICD-10-CM

## 2022-09-09 DIAGNOSIS — R00.1 SINUS BRADYCARDIA: ICD-10-CM

## 2022-09-09 PROCEDURE — 99214 OFFICE O/P EST MOD 30 MIN: CPT | Performed by: NURSE PRACTITIONER

## 2022-09-09 NOTE — LETTER
9/9/2022    Roshan Sandoval MD  [facility] [address]  [place] [address]    RE: Dilip Frye       Dear Colleague,     I had the pleasure of seeing Dilip Frye in the [facility].          Assessment/Recommendations   Assessment/Plan:  1.  Coronary artery disease: Patient was hospitalized from July 24 through July 27 with chest pain secondary to non-STEMI.  Coronary angiogram on 7/25/2022 showed 100% chronic occlusion in the right coronary artery.  Echocardiogram showed mild hypokinesis in inferolateral wall.  Cardiac MRI showed high probability of viability in the territory of RCA.     She underwent successful staged PCI of RCA  on 8/24/2022.    On dual antiplatelet therapy with aspirin 80 mg indefinitely and Brilinta 90 mg twice a day for 12 months..             2.  Dyslipidemia: Dilip Frye is on high intensity statin therapy with atorvastatin 40 mg daily. His most recent LDL is 119 and triglycerides 177 in July 2022.  Most recent AST/ALT are stable.    3.  Hypertension: Blood pressures well controlled.     on lisinopril 10 mg daily, metoprolol succinate 25 mg daily.    Plan:  -Continue dual antiplatelet therapy for 1 year and then stop Brilinta and continue on aspirin 81 mg indefinitely.  -Continue statin therapy  -Fasting lipid profile and AST and ALT check in the next 1 week.  -Provided education material/information on coronary disease and heart healthy diet.    Follow-up with Dr. Antony as scheduled     History of Present Illness/Subjective    Dilip Frye is a 72 year old years old with recent diagnosis of coronary artery disease with known chronic total occlusion of RCA, hypertension, dyslipidemia, sinus bradycardia, GERD, and hyponatremia who is seen at [facility] for post coronary intervention follow-up.    Today, patient is here accompanied by his wife. He denies fatigue, lightheadedness, shortness of breath,  dyspnea on exertion, orthopnea, PND, palpitations, chest pain, abdominal fullness/bloating and lower extremity edema.  He denies any bleeding complications.  His right groin puncture was sore in the beginning and has now improved.    Coronary angiogram done on 8/24/2022-reviewed  Conclusion    72-year-old male with recent presentation with ST elevation myocardial infarction, and subsequent coronary angiography showing a chronic occlusion of the mid RCA with collaterals to the PDA and BERNADETTE.  He was started on appropriate medical management and referred for PCI of his RCA .     Coronary angiography today showed partial recanalization of the  with residual value 95% stenosis.  This was successfully revascularized with PTCA followed by deployment of a 4.5 x 24 mm Synergy drug-eluting stent, resulting in 0% residual and ANGELITO-3 flow post.     The left coronary artery was not selectively engaged but injected and is known to have mild atherosclerosis from his previous angiogram.        Recommendations    Dual antiplatelet therapy with aspirin and ticagrelor for 1 year followed by aspirin indefinitely  Ongoing risk factor modification     Cardiac MRI on 7/26/2022-reviewed  SUMMARY  1.  Left ventricular size is at the upper limits of normal. Wall thickness is normal. Systolic function is  mildly reduced with inferior and inferolateral wall hypokinesis. The quantified left ventricular ejection  fraction is 49%.   2.  Moderate-sized area of myocardial edema and decreased resting perfusion in the rknir-oe-xce inferior  and inferolateral wall consistent with acute-to-subacute myocardial infarction in the territory of the  right coronary artery.  3.  Minimal subendocardial scar extending to 1-25% the thickness of the basal inferior and inferolateral  walls suggesting a high probability of viability in the the territory of the right coronary artery.   4.  Right ventricular size is mildly enlarged. Systolic function is mildly  reduced.  The quantified right  ventricular ejection fraction is 45%.   5.  Mild left atrial enlargement.  6.  No significant valvular abnormalities.    Echocardiogram on 7/24/2022-reviewed  Interpretation Summary     1. Left ventricular size and wall thickness are normal. There is mild  inferolateral wall hypokinesis although overall systolic function is normal.  The calculated left ventricular ejection fraction is 57%.  2. Right ventricular size and systolic function are normal.  3. No hemodynamically significant valvular abnormalities.  4. No prior study available for comparison.    Coronary Angiogram done on 7/25/2022-reviewed:  Conclusion    Left ventricular filling pressures are normal.    Prox LAD to Mid LAD lesion is 20% stenosed.    Prox RCA to Mid RCA lesion is 100% stenosed.    Global LV function mild reduction with akintis inferobase   Plan    Follow bedrest per protocol    Continued medical management and lifestyle modifications for cardiovascular risk factor optimizations.    Arterial sheath removed from radial artery with TR band placement.  Patient with total occlusion of late proximal RCA.  RCA appears to be a large-caliber vessel.  LV suggests akinesis of the inferior base.  Considerations may be to evaluate for extent of viability of the madelyn-infarct region.  If sizable viable tissue remains could be candidate for  PCI.  For now maximize medical therapy beta-blockers, nitrates, and risk factor reduction.  If minimal viable tissue is present and patient asymptomatic with defer revascularization considerations     Recommendations  General Recommendations:  - Patient given specific instructions regarding care of arteriotomy site, activity restrictions, signs and symptoms of cardiac or vascular complications and to seek immediate medical evaluation should they occur.   - Arterial sheath removed from radial artery with TR Band placement.        Physical Examination Review of Systems   /77 (BP  "Location: Left arm, Patient Position: Sitting, Cuff Size: Adult Regular)   Pulse 65   Resp 16   Ht 1.93 m (6' 4\")   Wt 99.5 kg (219 lb 4.8 oz)   BMI 26.69 kg/m    Body mass index is 26.69 kg/m .  Wt Readings from Last 3 Encounters:   09/09/22 99.5 kg (219 lb 4.8 oz)   08/25/22 98 kg (216 lb)   08/02/22 100.2 kg (221 lb)     General Appearance:   no distress, normal body habitus   ENT/Mouth: membranes moist, no oral lesions or bleeding gums.      EYES:  no scleral icterus, normal conjunctivae   Neck: no carotid bruits or thyromegaly   Chest/Lungs:   lungs are clear to auscultation, no rales or wheezing, equal chest wall expansion    Cardiovascular:   Heart rate regular. Normal first and second heart sounds with no murmurs, rubs, or gallops; the carotid, radial and posterior tibial pulses are intact, Jugular venous pressure flat with no edema bilaterally, bilateral groin puncture site intact except noted firmness on right groin but soft and intact nontender.  Mild bruising but no hematoma noted.   Abdomen:  no organomegaly, masses, bruits, or tenderness; bowel sounds are present   Extremities   no cyanosis or clubbing. Right radial puncture site intact. Radial pulses and Pedal pulses intact and symmetrical.  CMS intact.   Skin: no xanthelasma, warm.    Neurologic: normal  bilateral, no tremors   Psychiatric: alert and oriented x3, calm             Negative unless noted in HPI     Medical History  Surgical History Family History Social History   Past Medical History:   Diagnosis Date     Benign essential hypertension      Gastroesophageal reflux disease with esophagitis     Past Surgical History:   Procedure Laterality Date     CERVICAL LAMINECTOMY       CV CORONARY ANGIOGRAM N/A 7/25/2022    Procedure: CV CORONARY ANGIOGRAM;  Surgeon: Quentin Miranda MD;  [facility]     CV LEFT HEART CATH N/A 7/25/2022    Procedure: Left Heart Catheterization;  Surgeon: Quentin Miranda MD;  Location: " [facility] LAB CV     CV LEFT VENTRICULOGRAM N/A 7/25/2022    Procedure: Left Ventriculogram;  Surgeon: Quentin Miranda MD;  Location: [facility] CV     CV PCI ANGIOPLASTY N/A 8/24/2022    Procedure: Percutaneous Transluminal Angioplasty;  Surgeon: Jude Haro MD;  Location: [facility] CV     CV PCI STENT DRUG ELUTING N/A 8/24/2022    Procedure: Percutaneous Coronary Intervention Stent;  Surgeon: Jude Haro MD;  Location: [facility] CV    Family History   Problem Relation Age of Onset     Brain Cancer Mother      No Known Problems Father      Coronary Artery Disease Early Onset No family hx of     Social History     Socioeconomic History     Marital status:      Spouse name: Not on file     Number of children: Not on file     Years of education: Not on file     Highest education level: Not on file   Occupational History     Not on file   Tobacco Use     Smoking status: Never Smoker     Smokeless tobacco: Never Used   Substance and Sexual Activity     Alcohol use: Yes     Alcohol/week: 1.0 standard drink     Comment: Alcoholic Drinks/day: One drink twice a month per pt     Drug use: No     Sexual activity: Not on file   Other Topics Concern     Not on file   Social History Narrative    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     Social Determinants of Health     Financial Resource Strain: Not on file   Food Insecurity: Not on file   Transportation Needs: Not on file   Physical Activity: Not on file   Stress: Not on file   Social Connections: Not on file   Intimate Partner Violence: Not on file   Housing Stability: Not on file          Medications  Allergies   Current Outpatient Medications   Medication Sig Dispense Refill     aspirin (ASA) 81 MG EC tablet Take 1 tablet (81 mg) by mouth daily Start tomorrow. 30 tablet 3     atorvastatin (LIPITOR) 40 MG tablet Take 1 tablet (40 mg) by mouth daily 90 tablet 3     cholecalciferol (VITAMIN D3) 125 mcg (5000  "units) capsule Take 125 mcg by mouth daily       lisinopril (PRINIVIL,ZESTRIL) 10 MG tablet [LISINOPRIL (PRINIVIL,ZESTRIL) 10 MG TABLET] Take 10 mg by mouth daily.       metoprolol succinate ER (TOPROL XL) 25 MG 24 hr tablet Take 1 tablet (25 mg) by mouth daily 90 tablet 3     nitroGLYcerin (NITROSTAT) 0.4 MG sublingual tablet One tablet under the tongue every 5 minutes if needed for chest pain. May repeat every 5 minutes for a maximum of 3 doses in 15 minutes\" 25 tablet 3     pantoprazole (PROTONIX) 40 MG tablet [PANTOPRAZOLE (PROTONIX) 40 MG TABLET] Take 40 mg by mouth daily.       ticagrelor (BRILINTA) 90 MG tablet Take 1 tablet (90 mg) by mouth 2 times daily Start this evening. 180 tablet 3     vitamin E (TOCOPHEROL) 400 units (180 mg) capsule Take 400 Units by mouth daily      No Known Allergies      Lab Results    Chemistry/lipid CBC Cardiac Enzymes/BNP/TSH/INR   Lab Results   Component Value Date    CHOL 184 07/25/2022    HDL 31 (L) 07/25/2022    TRIG 171 (H) 07/25/2022    BUN 13 08/24/2022     08/24/2022    CO2 27 08/24/2022    Lab Results   Component Value Date    WBC 6.1 08/24/2022    HGB 13.0 (L) 08/24/2022    HCT 37.6 (L) 08/24/2022    MCV 89 08/24/2022     08/24/2022    Lab Results   Component Value Date    TROPONINI 7.53 (HH) 07/25/2022    BNP 24 07/24/2022    TSH 2.04 07/25/2022    INR 1.01 07/24/2022        30  minutes spent on the date of encounter doing chart review, review of test results, interpretation with above tests, patient visit, documentation and discussion with family.        This note has been dictated using voice recognition software. Any grammatical, typographical, or context distortions are unintentional and inherent to the software        Thank you for allowing me to participate in the care of your patient.      Sincerely,     [facility]    cc:   Jude Haro MD  [address] [place] [address] " [address]  [place] [address]

## 2022-09-09 NOTE — PATIENT INSTRUCTIONS
Dilip Frye,    It was a pleasure to see you today at the [facility].     My recommendations after this visit include:    - No medications changes made today    - Please seek medical attention if you develop recurrent chest pain or shortness of breath or similar symptoms you experienced prior to recent cardiac event    - Please get your lipid level test in a week or so.  Make sure to fast for 8-12 hours prior to lab work. Okay to have plain water, black coffee or tea without creamer and sugar- Follow up with Dr. Antony as scheduled    - Please call [phone], if you have any questions or concerns    Kera Zuñiga CNP    Medication     Take all your medications as prescribed  Do not stop any medications without talking with a healthcare provider    Exercise      Physical activity is important for overall health  Set a goal of 150 minutes of exercise each week  For example, 30 minutes of exercise 5 days each week.    These 30 minutes can be broken into shorter periods of 15 minutes twice daily or 10 minutes three times daily  Start any exercise program slowly and work towards the goal of 150 minutes each week  For example, you may start with 10 minutes and plan to add a few minutes each week as you get stronger   Examples of exercise include walking, swimming, or biking  Remember to stretch and stay hydrated with exercise    Diet     A heart healthy diet includes:  A variety of fruits and vegetables  Whole grains  Low-fat dairy (fat-free, 1% fat, and low-fat)  Lean meats and poultry without skin   Fish (eat fish 2 times each week)  Nuts  Limit saturated fat to about 13 grams each day (based on a 2000 calorie diet)  Limit red meat  Limit sugars (sweets and sugary beverages)  Limit your portion sizes  Do not add salt to your food when cooking or at the table  Limit alcohol intake (no more than 1 drink each day for women or 2 drinks each day for men)    Weight Loss     Work on losing  weight with diet and exercise  You BMI (body mass index) should be between 18.5-24.9  This is a calculation of your weight and height  Please ask your healthcare provider for your BMI    Manage Other Chronic Health Conditions     Control cholesterol  Eat a diet low in saturated fat  Exercise   Take a statin medication as prescribed  Manage blood pressure  Eat a diet low in sodium  Exercise  Reduce stress  Lose weight   Take blood pressure medications as prescribed  Control blood sugars if diabetic  Monitor sugars and carbohydrates in your diet  Lose weight   Take diabetes medications as prescribed  Follow-up with your primary care provider to make sure your blood sugars are well controlled    Stress Reduction     Find time each day to relax  Reading, listening to music, yoga, meditation, exercise, spending time with friends and family, volunteering   Get 6-8 hours of sleep each night    Smoking Cessation     Smoking causes numerous health problems including coronary artery disease  It is never too late to quit  Set realistic goals for quitting  Decrease the number of cigarettes used each week  Use nicotine gum or patches to help you quit    Information from the [employer] Association.  Please visit their website at www.heart.org

## 2022-09-15 ENCOUNTER — LAB (OUTPATIENT)
Dept: LAB | Facility: HOSPITAL | Age: 72
End: 2022-09-15
Payer: COMMERCIAL

## 2022-09-15 DIAGNOSIS — E78.5 DYSLIPIDEMIA, GOAL LDL BELOW 70: ICD-10-CM

## 2022-09-15 DIAGNOSIS — I21.4 NSTEMI (NON-ST ELEVATED MYOCARDIAL INFARCTION) (H): ICD-10-CM

## 2022-09-15 LAB
ALT SERPL W P-5'-P-CCNC: 20 U/L (ref 0–45)
AST SERPL W P-5'-P-CCNC: 17 U/L (ref 0–40)
CHOLEST SERPL-MCNC: 117 MG/DL
FASTING STATUS PATIENT QL REPORTED: YES
HDLC SERPL-MCNC: 30 MG/DL
LDLC SERPL CALC-MCNC: 62 MG/DL
TRIGL SERPL-MCNC: 123 MG/DL

## 2022-09-15 PROCEDURE — 80061 LIPID PANEL: CPT

## 2022-09-15 PROCEDURE — 84460 ALANINE AMINO (ALT) (SGPT): CPT

## 2022-09-15 PROCEDURE — 84450 TRANSFERASE (AST) (SGOT): CPT

## 2022-09-15 PROCEDURE — 36415 COLL VENOUS BLD VENIPUNCTURE: CPT

## 2022-09-17 DIAGNOSIS — I21.4 NSTEMI (NON-ST ELEVATED MYOCARDIAL INFARCTION) (H): ICD-10-CM

## 2022-09-17 DIAGNOSIS — I25.118 CORONARY ARTERY DISEASE OF NATIVE ARTERY OF NATIVE HEART WITH STABLE ANGINA PECTORIS (H): ICD-10-CM

## 2022-09-19 RX ORDER — ASPIRIN 81 MG/1
TABLET, COATED ORAL
Qty: 30 TABLET | Refills: 3 | Status: SHIPPED | OUTPATIENT
Start: 2022-09-19 | End: 2022-11-22

## 2022-09-27 ENCOUNTER — OFFICE VISIT (OUTPATIENT)
Dept: CARDIOLOGY | Facility: CLINIC | Age: 72
End: 2022-09-27
Payer: COMMERCIAL

## 2022-09-27 VITALS
WEIGHT: 226 LBS | BODY MASS INDEX: 27.52 KG/M2 | RESPIRATION RATE: 16 BRPM | HEART RATE: 68 BPM | DIASTOLIC BLOOD PRESSURE: 58 MMHG | SYSTOLIC BLOOD PRESSURE: 100 MMHG | HEIGHT: 76 IN

## 2022-09-27 DIAGNOSIS — I25.10 CORONARY ARTERY DISEASE INVOLVING NATIVE HEART, UNSPECIFIED VESSEL OR LESION TYPE, UNSPECIFIED WHETHER ANGINA PRESENT: Primary | ICD-10-CM

## 2022-09-27 PROCEDURE — 99213 OFFICE O/P EST LOW 20 MIN: CPT | Performed by: INTERNAL MEDICINE

## 2022-09-27 NOTE — LETTER
"9/27/2022    Roshan Sandoval MD  [facility] [address]  [place] [address]    RE: Dilip Frye       Dear Colleague,     I had the pleasure of seeing Dilip Frye in the [facility].    HEART CARE NOTE          Assessment/Recommendations     1. Chest pain c/b NSTEMI  Assessment / Plan    S/p coronary angiogram significant for mild mid LAD disease and 100% RCA; S/p MRI demonstrated viability with subsequent PCI to RCA/ on 8/24/22    Denies chest pain r anginal equivalents     Continue ASA, atorvastatin, lisinopril, ticagrelor and metoprolol     2. HTN  Assessment / Plan    Adequately controlled on current regimen - no changes at this time      History of Present Illness/Subjective      Mr. Dilip Frye is a 72 year old male with a PMHx significant for HTN who presents with NSTEMI.     Today, Mr. Frye denies any current chest pain, palpitations, lightheadedness/dizziness, dyspnea; Management plan as detailed above.     ECG: Personally reviewed. sinus bradycardia, occasional PVC noted, unifocal.     Coronary angiogram/PCI:  Coronary angiography today showed partial recanalization of the  with residual value 95% stenosis.  This was successfully revascularized with PTCA followed by deployment of a 4.5 x 24 mm Synergy drug-eluting stent, resulting in 0% residual and ANGELITO-3 flow post.    ECHO:    1. Left ventricular size and wall thickness are normal. There is mild  inferolateral wall hypokinesis although overall systolic function is normal.  The calculated left ventricular ejection fraction is 57%.  2. Right ventricular size and systolic function are normal.  3. No hemodynamically significant valvular abnormalities.  4. No prior study available for comparison.            Physical Examination Review of Systems   /58 (BP Location: Right arm, Patient Position: Sitting, Cuff Size: Adult Regular)   Pulse 68   Resp 16   Ht 1.93 m (6' 4\")   Wt 102.5 kg (226 " lb)   BMI 27.51 kg/m    Body mass index is 27.51 kg/m .  Wt Readings from Last 3 Encounters:   09/27/22 102.5 kg (226 lb)   09/09/22 99.5 kg (219 lb 4.8 oz)   08/25/22 98 kg (216 lb)     General Appearance:   no distress, normal body habitus   ENT/Mouth: membranes moist, no oral lesions or bleeding gums.      EYES:  no scleral icterus, normal conjunctivae   Neck: no carotid bruits or thyromegaly   Chest/Lungs:   lungs are clear to auscultation, no rales or wheezing, equal chest wall expansion    Cardiovascular:   Regular. Normal first and second heart sounds with no murmurs, rubs, or gallops; the carotid, radial and posterior tibial pulses are intact, no JVD or LE edema bilaterally    Abdomen:  no organomegaly, masses, bruits, or tenderness; bowel sounds are present   Extremities: no cyanosis or clubbing   Skin: no xanthelasma, warm.    Neurologic: alert and oriented x3, calm     Psychiatric: alert and oriented x3, calm     A complete 10 systems ROS was reviewed  And is negative except what is listed in the HPI.          Medical History  Surgical History Family History Social History   Past Medical History:   Diagnosis Date     Benign essential hypertension      Gastroesophageal reflux disease with esophagitis     Past Surgical History:   Procedure Laterality Date     CERVICAL LAMINECTOMY       CV CORONARY ANGIOGRAM N/A 7/25/2022    Procedure: CV CORONARY ANGIOGRAM;  Surgeon: Quentin Miranda MD;  Location: [facility] CV     CV LEFT HEART CATH N/A 7/25/2022    Procedure: Left Heart Catheterization;  Surgeon: Quentin Miranda MD;  Location: [facility] CV     CV LEFT VENTRICULOGRAM N/A 7/25/2022    Procedure: Left Ventriculogram;  Surgeon: Quentin Miranda MD;  Location: [facility] CV     CV PCI ANGIOPLASTY N/A 8/24/2022    Procedure: Percutaneous Transluminal Angioplasty;  Surgeon: Jude Haor MD;  Location: [facility] CV     CV PCI STENT DRUG ELUTING N/A 8/24/2022     Procedure: Percutaneous Coronary Intervention Stent;  Surgeon: Jude Hrao MD;  Location: [facility] LAB CV    no family history of premature coronary artery disease Social History     Socioeconomic History     Marital status:      Spouse name: Not on file     Number of children: Not on file     Years of education: Not on file     Highest education level: Not on file   Occupational History     Not on file   Tobacco Use     Smoking status: Never Smoker     Smokeless tobacco: Never Used   Substance and Sexual Activity     Alcohol use: Yes     Alcohol/week: 1.0 standard drink     Comment: Alcoholic Drinks/day: One drink twice a month per pt     Drug use: No     Sexual activity: Not on file   Other Topics Concern     Not on file   Social History Narrative    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     Social Determinants of Health     Financial Resource Strain: Not on file   Food Insecurity: Not on file   Transportation Needs: Not on file   Physical Activity: Not on file   Stress: Not on file   Social Connections: Not on file   Intimate Partner Violence: Not on file   Housing Stability: Not on file           Lab Results    Chemistry/lipid CBC Cardiac Enzymes/BNP/TSH/INR   Lab Results   Component Value Date    CHOL 117 09/15/2022    HDL 30 (L) 09/15/2022    TRIG 123 09/15/2022    BUN 13 08/24/2022     08/24/2022    CO2 27 08/24/2022    Lab Results   Component Value Date    WBC 6.1 08/24/2022    HGB 13.0 (L) 08/24/2022    HCT 37.6 (L) 08/24/2022    MCV 89 08/24/2022     08/24/2022    Lab Results   Component Value Date    TROPONINI 7.53 (HH) 07/25/2022    BNP 24 07/24/2022    TSH 2.04 07/25/2022    INR 1.01 07/24/2022     Lab Results   Component Value Date    TROPONINI 7.53 (HH) 07/25/2022          Weight:    Wt Readings from Last 3 Encounters:   09/09/22 99.5 kg (219 lb 4.8 oz)   08/25/22 98 kg (216 lb)   08/02/22 100.2 kg (221 lb)       Allergies  No Known  Allergies      Surgical History  Past Surgical History:   Procedure Laterality Date     CERVICAL LAMINECTOMY       CV CORONARY ANGIOGRAM N/A 7/25/2022    Procedure: CV CORONARY ANGIOGRAM;  Surgeon: Quentin Miranda MD;  Location: [facility] CV     CV LEFT HEART CATH N/A 7/25/2022    Procedure: Left Heart Catheterization;  Surgeon: uQentin Miranda MD;  Location: [facility] CV     CV LEFT VENTRICULOGRAM N/A 7/25/2022    Procedure: Left Ventriculogram;  Surgeon: Quentin Miranda MD;  Location: [facility] CV     CV PCI ANGIOPLASTY N/A 8/24/2022    Procedure: Percutaneous Transluminal Angioplasty;  Surgeon: Jude Haro MD;  Location: [facility] CV     CV PCI STENT DRUG ELUTING N/A 8/24/2022    Procedure: Percutaneous Coronary Intervention Stent;  Surgeon: Jude Haro MD;  Location: [facility] CV       Social History  Tobacco:   History   Smoking Status     Never Smoker   Smokeless Tobacco     Never Used    Alcohol:   Social History    Substance and Sexual Activity      Alcohol use: Yes        Alcohol/week: 1.0 standard drink        Comment: Alcoholic Drinks/day: One drink twice a month per pt   Illicit Drugs:   History   Drug Use No       Family History  Family History   Problem Relation Age of Onset     Brain Cancer Mother      No Known Problems Father      Coronary Artery Disease Early Onset No family hx of           Syed Antony MD on 9/27/2022      cc: Roshan Sandoval    Thank you for allowing me to participate in the care of your patient.      Sincerely,     Syed Antony MD     [facility]  cc:   Quentin Miranda MD  [address], [address]  [place] [address]

## 2022-09-27 NOTE — PROGRESS NOTES
"  HEART CARE NOTE          Assessment/Recommendations     1. Chest pain c/b NSTEMI  Assessment / Plan    S/p coronary angiogram significant for mild mid LAD disease and 100% RCA; S/p MRI demonstrated viability with subsequent PCI to RCA/ on 8/24/22    Denies chest pain r anginal equivalents     Continue ASA, atorvastatin, lisinopril, ticagrelor and metoprolol     2. HTN  Assessment / Plan    Adequately controlled on current regimen - no changes at this time      History of Present Illness/Subjective      Mr. Dilip Frye is a 72 year old male with a PMHx significant for HTN who presents with NSTEMI.     Today, Mr. Frye denies any current chest pain, palpitations, lightheadedness/dizziness, dyspnea; Management plan as detailed above.     ECG: Personally reviewed. sinus bradycardia, occasional PVC noted, unifocal.     Coronary angiogram/PCI:  Coronary angiography today showed partial recanalization of the  with residual value 95% stenosis.  This was successfully revascularized with PTCA followed by deployment of a 4.5 x 24 mm Synergy drug-eluting stent, resulting in 0% residual and ANGELITO-3 flow post.    ECHO:    1. Left ventricular size and wall thickness are normal. There is mild  inferolateral wall hypokinesis although overall systolic function is normal.  The calculated left ventricular ejection fraction is 57%.  2. Right ventricular size and systolic function are normal.  3. No hemodynamically significant valvular abnormalities.  4. No prior study available for comparison.            Physical Examination Review of Systems   /58 (BP Location: Right arm, Patient Position: Sitting, Cuff Size: Adult Regular)   Pulse 68   Resp 16   Ht 1.93 m (6' 4\")   Wt 102.5 kg (226 lb)   BMI 27.51 kg/m    Body mass index is 27.51 kg/m .  Wt Readings from Last 3 Encounters:   09/27/22 102.5 kg (226 lb)   09/09/22 99.5 kg (219 lb 4.8 oz)   08/25/22 98 kg (216 lb)     General Appearance:   no distress, normal body " habitus   ENT/Mouth: membranes moist, no oral lesions or bleeding gums.      EYES:  no scleral icterus, normal conjunctivae   Neck: no carotid bruits or thyromegaly   Chest/Lungs:   lungs are clear to auscultation, no rales or wheezing, equal chest wall expansion    Cardiovascular:   Regular. Normal first and second heart sounds with no murmurs, rubs, or gallops; the carotid, radial and posterior tibial pulses are intact, no JVD or LE edema bilaterally    Abdomen:  no organomegaly, masses, bruits, or tenderness; bowel sounds are present   Extremities: no cyanosis or clubbing   Skin: no xanthelasma, warm.    Neurologic: alert and oriented x3, calm     Psychiatric: alert and oriented x3, calm     A complete 10 systems ROS was reviewed  And is negative except what is listed in the HPI.          Medical History  Surgical History Family History Social History   Past Medical History:   Diagnosis Date     Benign essential hypertension      Gastroesophageal reflux disease with esophagitis     Past Surgical History:   Procedure Laterality Date     CERVICAL LAMINECTOMY       CV CORONARY ANGIOGRAM N/A 7/25/2022    Procedure: CV CORONARY ANGIOGRAM;  Surgeon: Quentin Miranda MD;  Location: [facility]     CV LEFT HEART CATH N/A 7/25/2022    Procedure: Left Heart Catheterization;  Surgeon: Quentin Miranda MD;  Location: [facility] CV     CV LEFT VENTRICULOGRAM N/A 7/25/2022    Procedure: Left Ventriculogram;  Surgeon: Quentin Miranda MD;  Location: [facility] CV     CV PCI ANGIOPLASTY N/A 8/24/2022    Procedure: Percutaneous Transluminal Angioplasty;  Surgeon: Jude Haro MD;  Location: [facility]     CV PCI STENT DRUG ELUTING N/A 8/24/2022    Procedure: Percutaneous Coronary Intervention Stent;  Surgeon: Jude Haro MD;  Location: [facility] CV    no family history of premature coronary artery disease Social History     Socioeconomic History     Marital status:       Spouse name: Not on file     Number of children: Not on file     Years of education: Not on file     Highest education level: Not on file   Occupational History     Not on file   Tobacco Use     Smoking status: Never Smoker     Smokeless tobacco: Never Used   Substance and Sexual Activity     Alcohol use: Yes     Alcohol/week: 1.0 standard drink     Comment: Alcoholic Drinks/day: One drink twice a month per pt     Drug use: No     Sexual activity: Not on file   Other Topics Concern     Not on file   Social History Narrative    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     Social Determinants of Health     Financial Resource Strain: Not on file   Food Insecurity: Not on file   Transportation Needs: Not on file   Physical Activity: Not on file   Stress: Not on file   Social Connections: Not on file   Intimate Partner Violence: Not on file   Housing Stability: Not on file           Lab Results    Chemistry/lipid CBC Cardiac Enzymes/BNP/TSH/INR   Lab Results   Component Value Date    CHOL 117 09/15/2022    HDL 30 (L) 09/15/2022    TRIG 123 09/15/2022    BUN 13 08/24/2022     08/24/2022    CO2 27 08/24/2022    Lab Results   Component Value Date    WBC 6.1 08/24/2022    HGB 13.0 (L) 08/24/2022    HCT 37.6 (L) 08/24/2022    MCV 89 08/24/2022     08/24/2022    Lab Results   Component Value Date    TROPONINI 7.53 (HH) 07/25/2022    BNP 24 07/24/2022    TSH 2.04 07/25/2022    INR 1.01 07/24/2022     Lab Results   Component Value Date    TROPONINI 7.53 (HH) 07/25/2022          Weight:    Wt Readings from Last 3 Encounters:   09/09/22 99.5 kg (219 lb 4.8 oz)   08/25/22 98 kg (216 lb)   08/02/22 100.2 kg (221 lb)       Allergies  No Known Allergies      Surgical History  Past Surgical History:   Procedure Laterality Date     CERVICAL LAMINECTOMY       CV CORONARY ANGIOGRAM N/A 7/25/2022    Procedure: CV CORONARY ANGIOGRAM;  Surgeon: Quentin Miranda MD;  Location: [facility] CATH LAB CV     CV  LEFT HEART CATH N/A 7/25/2022    Procedure: Left Heart Catheterization;  Surgeon: Quentin Miranda MD;  Location: [facility] CV     CV LEFT VENTRICULOGRAM N/A 7/25/2022    Procedure: Left Ventriculogram;  Surgeon: Quentin Miranda MD;  Location: [facility] CV     CV PCI ANGIOPLASTY N/A 8/24/2022    Procedure: Percutaneous Transluminal Angioplasty;  Surgeon: Jude Haro MD;  Location: [facility] CV     CV PCI STENT DRUG ELUTING N/A 8/24/2022    Procedure: Percutaneous Coronary Intervention Stent;  Surgeon: Jude Haro MD;  Location: [facility] CV       Social History  Tobacco:   History   Smoking Status     Never Smoker   Smokeless Tobacco     Never Used    Alcohol:   Social History    Substance and Sexual Activity      Alcohol use: Yes        Alcohol/week: 1.0 standard drink        Comment: Alcoholic Drinks/day: One drink twice a month per pt   Illicit Drugs:   History   Drug Use No       Family History  Family History   Problem Relation Age of Onset     Brain Cancer Mother      No Known Problems Father      Coronary Artery Disease Early Onset No family hx of           Syed Antony MD on 9/27/2022      cc: Roshan Sandoval

## 2022-11-03 ENCOUNTER — LAB REQUISITION (OUTPATIENT)
Dept: LAB | Facility: CLINIC | Age: 72
End: 2022-11-03

## 2022-11-03 DIAGNOSIS — E78.5 HYPERLIPIDEMIA, UNSPECIFIED: ICD-10-CM

## 2022-11-03 DIAGNOSIS — I10 ESSENTIAL (PRIMARY) HYPERTENSION: ICD-10-CM

## 2022-11-03 LAB
ANION GAP SERPL CALCULATED.3IONS-SCNC: 11 MMOL/L (ref 7–15)
BUN SERPL-MCNC: 9.8 MG/DL (ref 8–23)
CALCIUM SERPL-MCNC: 9.6 MG/DL (ref 8.8–10.2)
CHLORIDE SERPL-SCNC: 99 MMOL/L (ref 98–107)
CHOLEST SERPL-MCNC: 118 MG/DL
CREAT SERPL-MCNC: 0.83 MG/DL (ref 0.67–1.17)
DEPRECATED HCO3 PLAS-SCNC: 26 MMOL/L (ref 22–29)
GFR SERPL CREATININE-BSD FRML MDRD: >90 ML/MIN/1.73M2
GLUCOSE SERPL-MCNC: 99 MG/DL (ref 70–99)
HDLC SERPL-MCNC: 39 MG/DL
LDLC SERPL CALC-MCNC: 60 MG/DL
NONHDLC SERPL-MCNC: 79 MG/DL
POTASSIUM SERPL-SCNC: 4.7 MMOL/L (ref 3.4–5.3)
SODIUM SERPL-SCNC: 136 MMOL/L (ref 136–145)
TRIGL SERPL-MCNC: 95 MG/DL

## 2022-11-03 PROCEDURE — 80061 LIPID PANEL: CPT | Performed by: FAMILY MEDICINE

## 2022-11-03 PROCEDURE — 80048 BASIC METABOLIC PNL TOTAL CA: CPT | Performed by: FAMILY MEDICINE

## 2022-11-21 DIAGNOSIS — I21.4 NSTEMI (NON-ST ELEVATED MYOCARDIAL INFARCTION) (H): ICD-10-CM

## 2022-11-21 DIAGNOSIS — I25.118 CORONARY ARTERY DISEASE OF NATIVE ARTERY OF NATIVE HEART WITH STABLE ANGINA PECTORIS (H): ICD-10-CM

## 2022-11-22 RX ORDER — ASPIRIN 81 MG/1
TABLET, COATED ORAL
Qty: 90 TABLET | Refills: 1 | Status: SHIPPED | OUTPATIENT
Start: 2022-11-22 | End: 2023-05-19

## 2023-05-19 DIAGNOSIS — I21.4 NSTEMI (NON-ST ELEVATED MYOCARDIAL INFARCTION) (H): ICD-10-CM

## 2023-05-19 DIAGNOSIS — I25.118 CORONARY ARTERY DISEASE OF NATIVE ARTERY OF NATIVE HEART WITH STABLE ANGINA PECTORIS (H): ICD-10-CM

## 2023-05-19 RX ORDER — ASPIRIN 81 MG/1
81 TABLET ORAL DAILY
Qty: 90 TABLET | Refills: 3 | Status: SHIPPED | OUTPATIENT
Start: 2023-05-19

## 2023-06-13 DIAGNOSIS — I21.4 NSTEMI (NON-ST ELEVATED MYOCARDIAL INFARCTION) (H): ICD-10-CM

## 2023-06-13 DIAGNOSIS — I25.118 CORONARY ARTERY DISEASE OF NATIVE ARTERY OF NATIVE HEART WITH STABLE ANGINA PECTORIS (H): ICD-10-CM

## 2023-06-14 RX ORDER — METOPROLOL SUCCINATE 25 MG/1
TABLET, EXTENDED RELEASE ORAL
Qty: 90 TABLET | Refills: 0 | Status: SHIPPED | OUTPATIENT
Start: 2023-06-14

## 2023-06-14 RX ORDER — ATORVASTATIN CALCIUM 40 MG/1
TABLET, FILM COATED ORAL
Qty: 90 TABLET | Refills: 3 | Status: SHIPPED | OUTPATIENT
Start: 2023-06-14

## 2023-08-08 DIAGNOSIS — I21.4 NSTEMI (NON-ST ELEVATED MYOCARDIAL INFARCTION) (H): ICD-10-CM

## 2023-08-09 RX ORDER — NITROGLYCERIN 0.4 MG/1
TABLET SUBLINGUAL
Qty: 25 TABLET | Refills: 3 | OUTPATIENT
Start: 2023-08-09

## 2023-08-09 NOTE — TELEPHONE ENCOUNTER
Rx filled by provider x 1, no longer follows with Ale Byers PA-C; primary cardiologist Dr. Antony

## 2023-08-10 DIAGNOSIS — I21.4 NSTEMI (NON-ST ELEVATED MYOCARDIAL INFARCTION) (H): Primary | ICD-10-CM

## 2023-08-10 RX ORDER — NITROGLYCERIN 0.4 MG/1
TABLET SUBLINGUAL
Qty: 25 TABLET | Refills: 0 | Status: SHIPPED | OUTPATIENT
Start: 2023-08-10

## 2023-12-05 ENCOUNTER — LAB REQUISITION (OUTPATIENT)
Dept: LAB | Facility: CLINIC | Age: 73
End: 2023-12-05

## 2023-12-05 DIAGNOSIS — M79.10 MYALGIA, UNSPECIFIED SITE: ICD-10-CM

## 2023-12-05 DIAGNOSIS — E78.5 HYPERLIPIDEMIA, UNSPECIFIED: ICD-10-CM

## 2023-12-05 DIAGNOSIS — I10 ESSENTIAL (PRIMARY) HYPERTENSION: ICD-10-CM

## 2023-12-05 PROCEDURE — 82550 ASSAY OF CK (CPK): CPT | Performed by: FAMILY MEDICINE

## 2023-12-05 PROCEDURE — 80048 BASIC METABOLIC PNL TOTAL CA: CPT | Performed by: FAMILY MEDICINE

## 2023-12-05 PROCEDURE — 80061 LIPID PANEL: CPT | Performed by: FAMILY MEDICINE

## 2023-12-06 LAB
ANION GAP SERPL CALCULATED.3IONS-SCNC: 10 MMOL/L (ref 7–15)
BUN SERPL-MCNC: 13.4 MG/DL (ref 8–23)
CALCIUM SERPL-MCNC: 9.3 MG/DL (ref 8.8–10.2)
CHLORIDE SERPL-SCNC: 101 MMOL/L (ref 98–107)
CHOLEST SERPL-MCNC: 152 MG/DL
CK SERPL-CCNC: 108 U/L (ref 39–308)
CREAT SERPL-MCNC: 0.94 MG/DL (ref 0.67–1.17)
DEPRECATED HCO3 PLAS-SCNC: 26 MMOL/L (ref 22–29)
EGFRCR SERPLBLD CKD-EPI 2021: 87 ML/MIN/1.73M2
FASTING STATUS PATIENT QL REPORTED: ABNORMAL
GLUCOSE SERPL-MCNC: 123 MG/DL (ref 70–99)
HDLC SERPL-MCNC: 32 MG/DL
LDLC SERPL CALC-MCNC: 64 MG/DL
NONHDLC SERPL-MCNC: 120 MG/DL
POTASSIUM SERPL-SCNC: 4.7 MMOL/L (ref 3.4–5.3)
SODIUM SERPL-SCNC: 137 MMOL/L (ref 135–145)
TRIGL SERPL-MCNC: 280 MG/DL

## 2024-12-02 ENCOUNTER — LAB REQUISITION (OUTPATIENT)
Dept: LAB | Facility: CLINIC | Age: 74
End: 2024-12-02

## 2024-12-02 ENCOUNTER — TRANSFERRED RECORDS (OUTPATIENT)
Dept: HEALTH INFORMATION MANAGEMENT | Facility: CLINIC | Age: 74
End: 2024-12-02
Payer: COMMERCIAL

## 2024-12-02 ENCOUNTER — MEDICAL CORRESPONDENCE (OUTPATIENT)
Dept: HEALTH INFORMATION MANAGEMENT | Facility: CLINIC | Age: 74
End: 2024-12-02
Payer: COMMERCIAL

## 2024-12-02 DIAGNOSIS — I10 ESSENTIAL (PRIMARY) HYPERTENSION: ICD-10-CM

## 2024-12-02 DIAGNOSIS — E78.5 HYPERLIPIDEMIA, UNSPECIFIED: ICD-10-CM

## 2024-12-02 PROCEDURE — 80061 LIPID PANEL: CPT | Performed by: FAMILY MEDICINE

## 2024-12-02 PROCEDURE — 80048 BASIC METABOLIC PNL TOTAL CA: CPT | Performed by: FAMILY MEDICINE

## 2024-12-03 LAB
ANION GAP SERPL CALCULATED.3IONS-SCNC: 10 MMOL/L (ref 7–15)
BUN SERPL-MCNC: 11.8 MG/DL (ref 8–23)
CALCIUM SERPL-MCNC: 9.1 MG/DL (ref 8.8–10.4)
CHLORIDE SERPL-SCNC: 100 MMOL/L (ref 98–107)
CHOLEST SERPL-MCNC: 202 MG/DL
CREAT SERPL-MCNC: 0.84 MG/DL (ref 0.67–1.17)
EGFRCR SERPLBLD CKD-EPI 2021: >90 ML/MIN/1.73M2
FASTING STATUS PATIENT QL REPORTED: ABNORMAL
FASTING STATUS PATIENT QL REPORTED: NORMAL
GLUCOSE SERPL-MCNC: 98 MG/DL (ref 70–99)
HCO3 SERPL-SCNC: 27 MMOL/L (ref 22–29)
HDLC SERPL-MCNC: 34 MG/DL
LDLC SERPL CALC-MCNC: 110 MG/DL
NONHDLC SERPL-MCNC: 168 MG/DL
POTASSIUM SERPL-SCNC: 4.7 MMOL/L (ref 3.4–5.3)
SODIUM SERPL-SCNC: 137 MMOL/L (ref 135–145)
TRIGL SERPL-MCNC: 292 MG/DL

## 2025-03-07 ENCOUNTER — TELEPHONE (OUTPATIENT)
Dept: CARDIOLOGY | Facility: CLINIC | Age: 75
End: 2025-03-07
Payer: COMMERCIAL

## 2025-03-07 DIAGNOSIS — I25.118 CORONARY ARTERY DISEASE OF NATIVE ARTERY OF NATIVE HEART WITH STABLE ANGINA PECTORIS: Primary | ICD-10-CM

## 2025-03-07 NOTE — TELEPHONE ENCOUNTER
[facility] Call Center    Phone Message    May a detailed message be left on voicemail: yes     Reason for Call: Other: Pt wife would like a call back to discuss pt being on Brilinta as he has been on it for 3 years and his pcp said to discuss as most pt are only on this medication for a year     Action Taken: Other: Cardio    Travel Screening: Not Applicable     Date of Service:

## 2025-03-10 RX ORDER — CLOPIDOGREL BISULFATE 75 MG/1
75 TABLET ORAL DAILY
Qty: 98 TABLET | Refills: 4 | Status: SHIPPED | OUTPATIENT
Start: 2025-03-10

## (undated) DEVICE — SYR ANGIOGRAPHY MULTIUSE KIT ACIST 014612

## (undated) DEVICE — CATH DIAG 4FR JR 5.0 538423

## (undated) DEVICE — SPIKE CONTRAST K08-00417B

## (undated) DEVICE — 0.035IN X 260CM INQWIRE DIAGNOSTIC GUIDEWIRE, FIXED-CORE PTFE COATED, 3MM J-TIP

## (undated) DEVICE — WIRE GUIDE 0.035"X260CM AMPTLAZ XSTIFF CVD THSCF-35-260-3-A

## (undated) DEVICE — SHEATH SUPERFLEX 6FR CL-07635

## (undated) DEVICE — RAD CLOSURE ANGIOSEAL 8FR  610131

## (undated) DEVICE — SHEATH 4FR ULTIMUM 407840

## (undated) DEVICE — KIT HAND CONTROL ACIST 016795

## (undated) DEVICE — CATH BALLOON EMERGE 3.5X12MM H7493918912350

## (undated) DEVICE — ELECTRODE ADULT PACING MULTI P-211-M1

## (undated) DEVICE — SHEATH ULTIMUM 6FR 12CM 407845

## (undated) DEVICE — SHEATH GLIDE RADIAL 4FR 25CM 0.021

## (undated) DEVICE — CATH ANGIO INFINITI JL4 4FRX100CM 538420

## (undated) DEVICE — CUSTOM PACK CORONARY SAN5BCRHEA

## (undated) DEVICE — MANIFOLD KIT ANGIO AUTOMATED 014613

## (undated) DEVICE — INTRO SHEATH 8FRX45CM PINNACLE DESTINATION STR 54-84501

## (undated) DEVICE — KIT HAND CONTROL ACIST 014644 AR-P54

## (undated) DEVICE — GUIDEWIRE FORTE FLOPPY J TOP 34949-05J

## (undated) DEVICE — TRANSDUCER W/MONITORING TRAY 42632-05

## (undated) DEVICE — GUIDEWIRE JTIP 3MM .035 180CM IQ35F180J3

## (undated) DEVICE — Device

## (undated) DEVICE — VALVE HEMOSTASIS .096" COPILOT MECH 1003331

## (undated) DEVICE — CATH ANGIO SUPERTORQUE MPA1 4FRX100CM 532430

## (undated) DEVICE — MISER CONTRAST MISER1

## (undated) DEVICE — INTRO MICRO MINI STICK 4FR STD NITINOL

## (undated) DEVICE — GUIDEWIRE ROSEN CVD .035X180CM J-TIP G01264

## (undated) DEVICE — INTRO MICRO MINI STICK 4FR STIFF NITINOL 45-753

## (undated) DEVICE — CATH TURNPIKE LP 150CM

## (undated) DEVICE — CATH DIAG 4FR STR PIG 538450S

## (undated) DEVICE — INTRO TERUMO 6FRX25CM W/MARKER RSB603

## (undated) DEVICE — CATH ANGIO INFINITI JL3.5 4FRX100CM 538418

## (undated) DEVICE — SLEEVE TR BAND RADIAL COMPRESSION DEVICE 29CM XX-RF06L

## (undated) RX ORDER — ACETAMINOPHEN 325 MG/1
TABLET ORAL
Status: DISPENSED
Start: 2022-08-24

## (undated) RX ORDER — FENTANYL CITRATE 50 UG/ML
INJECTION, SOLUTION INTRAMUSCULAR; INTRAVENOUS
Status: DISPENSED
Start: 2022-08-24

## (undated) RX ORDER — ASPIRIN 81 MG/1
TABLET, CHEWABLE ORAL
Status: DISPENSED
Start: 2022-07-25

## (undated) RX ORDER — DIAZEPAM 5 MG
TABLET ORAL
Status: DISPENSED
Start: 2022-08-24

## (undated) RX ORDER — DIAZEPAM 5 MG
TABLET ORAL
Status: DISPENSED
Start: 2022-07-25

## (undated) RX ORDER — FENTANYL CITRATE 50 UG/ML
INJECTION, SOLUTION INTRAMUSCULAR; INTRAVENOUS
Status: DISPENSED
Start: 2022-07-25